# Patient Record
Sex: MALE | Race: WHITE | NOT HISPANIC OR LATINO | URBAN - METROPOLITAN AREA
[De-identification: names, ages, dates, MRNs, and addresses within clinical notes are randomized per-mention and may not be internally consistent; named-entity substitution may affect disease eponyms.]

---

## 2018-02-25 ENCOUNTER — INPATIENT (INPATIENT)
Facility: HOSPITAL | Age: 63
LOS: 3 days | Discharge: HOME | DRG: 194 | End: 2018-03-01
Attending: INTERNAL MEDICINE | Admitting: INTERNAL MEDICINE
Payer: COMMERCIAL

## 2018-02-25 VITALS
DIASTOLIC BLOOD PRESSURE: 75 MMHG | OXYGEN SATURATION: 96 % | SYSTOLIC BLOOD PRESSURE: 120 MMHG | RESPIRATION RATE: 16 BRPM | WEIGHT: 190.04 LBS | HEART RATE: 98 BPM | HEIGHT: 71 IN | TEMPERATURE: 99 F

## 2018-02-25 DIAGNOSIS — I85.00 ESOPHAGEAL VARICES WITHOUT BLEEDING: ICD-10-CM

## 2018-02-25 DIAGNOSIS — R55 SYNCOPE AND COLLAPSE: ICD-10-CM

## 2018-02-25 DIAGNOSIS — R79.89 OTHER SPECIFIED ABNORMAL FINDINGS OF BLOOD CHEMISTRY: ICD-10-CM

## 2018-02-25 DIAGNOSIS — K71.7 TOXIC LIVER DISEASE WITH FIBROSIS AND CIRRHOSIS OF LIVER: ICD-10-CM

## 2018-02-25 DIAGNOSIS — Z29.9 ENCOUNTER FOR PROPHYLACTIC MEASURES, UNSPECIFIED: ICD-10-CM

## 2018-02-25 DIAGNOSIS — D72.829 ELEVATED WHITE BLOOD CELL COUNT, UNSPECIFIED: ICD-10-CM

## 2018-02-25 DIAGNOSIS — J06.9 ACUTE UPPER RESPIRATORY INFECTION, UNSPECIFIED: ICD-10-CM

## 2018-02-25 DIAGNOSIS — K74.60 UNSPECIFIED CIRRHOSIS OF LIVER: ICD-10-CM

## 2018-02-25 DIAGNOSIS — J90 PLEURAL EFFUSION, NOT ELSEWHERE CLASSIFIED: ICD-10-CM

## 2018-02-25 DIAGNOSIS — R50.9 FEVER, UNSPECIFIED: ICD-10-CM

## 2018-02-25 DIAGNOSIS — E80.6 OTHER DISORDERS OF BILIRUBIN METABOLISM: ICD-10-CM

## 2018-02-25 DIAGNOSIS — B18.2 CHRONIC VIRAL HEPATITIS C: ICD-10-CM

## 2018-02-25 DIAGNOSIS — Z98.890 OTHER SPECIFIED POSTPROCEDURAL STATES: Chronic | ICD-10-CM

## 2018-02-25 DIAGNOSIS — J10.1 INFLUENZA DUE TO OTHER IDENTIFIED INFLUENZA VIRUS WITH OTHER RESPIRATORY MANIFESTATIONS: ICD-10-CM

## 2018-02-25 LAB
ALBUMIN SERPL ELPH-MCNC: 2.8 G/DL — LOW (ref 3.3–5)
ALP SERPL-CCNC: 105 U/L — SIGNIFICANT CHANGE UP (ref 40–120)
ALT FLD-CCNC: 39 U/L — SIGNIFICANT CHANGE UP (ref 12–78)
ANION GAP SERPL CALC-SCNC: 7 MMOL/L — SIGNIFICANT CHANGE UP (ref 5–17)
APPEARANCE UR: CLEAR — SIGNIFICANT CHANGE UP
APTT BLD: 38.8 SEC — HIGH (ref 27.5–37.4)
AST SERPL-CCNC: 40 U/L — HIGH (ref 15–37)
BASOPHILS # BLD AUTO: 0.1 K/UL — SIGNIFICANT CHANGE UP (ref 0–0.2)
BASOPHILS NFR BLD AUTO: 0.9 % — SIGNIFICANT CHANGE UP (ref 0–2)
BILIRUB SERPL-MCNC: 5.9 MG/DL — HIGH (ref 0.2–1.2)
BILIRUB UR-MCNC: NEGATIVE — SIGNIFICANT CHANGE UP
BUN SERPL-MCNC: 11 MG/DL — SIGNIFICANT CHANGE UP (ref 7–23)
CALCIUM SERPL-MCNC: 8.5 MG/DL — SIGNIFICANT CHANGE UP (ref 8.5–10.1)
CHLORIDE SERPL-SCNC: 104 MMOL/L — SIGNIFICANT CHANGE UP (ref 96–108)
CK MB BLD-MCNC: <0.8 % — SIGNIFICANT CHANGE UP (ref 0–3.5)
CK MB CFR SERPL CALC: <0.5 NG/ML — SIGNIFICANT CHANGE UP (ref 0–3.6)
CK SERPL-CCNC: 59 U/L — SIGNIFICANT CHANGE UP (ref 26–308)
CO2 SERPL-SCNC: 25 MMOL/L — SIGNIFICANT CHANGE UP (ref 22–31)
COLOR SPEC: YELLOW — SIGNIFICANT CHANGE UP
CREAT SERPL-MCNC: 0.88 MG/DL — SIGNIFICANT CHANGE UP (ref 0.5–1.3)
CRP SERPL-MCNC: 10.8 MG/DL — HIGH (ref 0–0.4)
DIFF PNL FLD: NEGATIVE — SIGNIFICANT CHANGE UP
EOSINOPHIL # BLD AUTO: 0.2 K/UL — SIGNIFICANT CHANGE UP (ref 0–0.5)
EOSINOPHIL NFR BLD AUTO: 1.5 % — SIGNIFICANT CHANGE UP (ref 0–6)
FLUBV RNA SPEC QL NAA+PROBE: DETECTED
GLUCOSE SERPL-MCNC: 134 MG/DL — HIGH (ref 70–99)
GLUCOSE UR QL: NEGATIVE — SIGNIFICANT CHANGE UP
HCT VFR BLD CALC: 38.7 % — LOW (ref 39–50)
HGB BLD-MCNC: 14.2 G/DL — SIGNIFICANT CHANGE UP (ref 13–17)
INR BLD: 1.63 RATIO — HIGH (ref 0.88–1.16)
KETONES UR-MCNC: NEGATIVE — SIGNIFICANT CHANGE UP
LACTATE SERPL-SCNC: 1.6 MMOL/L — SIGNIFICANT CHANGE UP (ref 0.7–2)
LACTATE SERPL-SCNC: 2.8 MMOL/L — HIGH (ref 0.7–2)
LEUKOCYTE ESTERASE UR-ACNC: ABNORMAL
LIDOCAIN IGE QN: 82 U/L — SIGNIFICANT CHANGE UP (ref 73–393)
LYMPHOCYTES # BLD AUTO: 0.8 K/UL — LOW (ref 1–3.3)
LYMPHOCYTES # BLD AUTO: 6.9 % — LOW (ref 13–44)
MCHC RBC-ENTMCNC: 35.5 PG — HIGH (ref 27–34)
MCHC RBC-ENTMCNC: 36.8 GM/DL — HIGH (ref 32–36)
MCV RBC AUTO: 96.4 FL — SIGNIFICANT CHANGE UP (ref 80–100)
MONOCYTES # BLD AUTO: 1.1 K/UL — HIGH (ref 0–0.9)
MONOCYTES NFR BLD AUTO: 9 % — SIGNIFICANT CHANGE UP (ref 1–9)
NEUTROPHILS # BLD AUTO: 9.9 K/UL — HIGH (ref 1.8–7.4)
NEUTROPHILS NFR BLD AUTO: 81.8 % — HIGH (ref 43–77)
NITRITE UR-MCNC: NEGATIVE — SIGNIFICANT CHANGE UP
PH UR: 7 — SIGNIFICANT CHANGE UP (ref 5–8)
PLATELET # BLD AUTO: 114 K/UL — LOW (ref 150–400)
POTASSIUM SERPL-MCNC: 4.2 MMOL/L — SIGNIFICANT CHANGE UP (ref 3.5–5.3)
POTASSIUM SERPL-SCNC: 4.2 MMOL/L — SIGNIFICANT CHANGE UP (ref 3.5–5.3)
PROCALCITONIN SERPL-MCNC: 0.55 NG/ML — HIGH (ref 0–0.04)
PROT SERPL-MCNC: 7.2 G/DL — SIGNIFICANT CHANGE UP (ref 6–8.3)
PROT UR-MCNC: 25 MG/DL
PROTHROM AB SERPL-ACNC: 17.9 SEC — HIGH (ref 9.8–12.7)
RAPID RVP RESULT: DETECTED
RBC # BLD: 4.01 M/UL — LOW (ref 4.2–5.8)
RBC # FLD: 12.8 % — SIGNIFICANT CHANGE UP (ref 10.3–14.5)
SODIUM SERPL-SCNC: 136 MMOL/L — SIGNIFICANT CHANGE UP (ref 135–145)
SP GR SPEC: 1 — LOW (ref 1.01–1.02)
T3 SERPL-MCNC: 56 NG/DL — LOW (ref 80–200)
T4 AB SER-ACNC: 4.9 UG/DL — SIGNIFICANT CHANGE UP (ref 4.6–12)
TROPONIN I SERPL-MCNC: <.015 NG/ML — SIGNIFICANT CHANGE UP (ref 0.01–0.04)
TSH SERPL-MCNC: 1.25 UIU/ML — SIGNIFICANT CHANGE UP (ref 0.36–3.74)
UROBILINOGEN FLD QL: 1
WBC # BLD: 12.1 K/UL — HIGH (ref 3.8–10.5)
WBC # FLD AUTO: 12.1 K/UL — HIGH (ref 3.8–10.5)
WBC UR QL: SIGNIFICANT CHANGE UP /HPF (ref 0–5)

## 2018-02-25 PROCEDURE — 72125 CT NECK SPINE W/O DYE: CPT | Mod: 26

## 2018-02-25 PROCEDURE — 71260 CT THORAX DX C+: CPT | Mod: 26

## 2018-02-25 PROCEDURE — 71045 X-RAY EXAM CHEST 1 VIEW: CPT | Mod: 26

## 2018-02-25 PROCEDURE — 93010 ELECTROCARDIOGRAM REPORT: CPT

## 2018-02-25 PROCEDURE — 70450 CT HEAD/BRAIN W/O DYE: CPT | Mod: 26

## 2018-02-25 PROCEDURE — 99285 EMERGENCY DEPT VISIT HI MDM: CPT

## 2018-02-25 PROCEDURE — 74177 CT ABD & PELVIS W/CONTRAST: CPT | Mod: 26

## 2018-02-25 PROCEDURE — 99223 1ST HOSP IP/OBS HIGH 75: CPT | Mod: AI,GC

## 2018-02-25 PROCEDURE — 99255 IP/OBS CONSLTJ NEW/EST HI 80: CPT

## 2018-02-25 RX ORDER — URSODIOL 250 MG/1
300 TABLET, FILM COATED ORAL
Qty: 0 | Refills: 0 | Status: DISCONTINUED | OUTPATIENT
Start: 2018-02-25 | End: 2018-03-01

## 2018-02-25 RX ORDER — CEFTRIAXONE 500 MG/1
1 INJECTION, POWDER, FOR SOLUTION INTRAMUSCULAR; INTRAVENOUS ONCE
Qty: 0 | Refills: 0 | Status: COMPLETED | OUTPATIENT
Start: 2018-02-25 | End: 2018-02-25

## 2018-02-25 RX ORDER — ALBUTEROL 90 UG/1
2.5 AEROSOL, METERED ORAL EVERY 12 HOURS
Qty: 0 | Refills: 0 | Status: DISCONTINUED | OUTPATIENT
Start: 2018-02-25 | End: 2018-03-01

## 2018-02-25 RX ORDER — MORPHINE SULFATE 50 MG/1
2 CAPSULE, EXTENDED RELEASE ORAL EVERY 6 HOURS
Qty: 0 | Refills: 0 | Status: DISCONTINUED | OUTPATIENT
Start: 2018-02-25 | End: 2018-03-01

## 2018-02-25 RX ORDER — IOHEXOL 300 MG/ML
15 INJECTION, SOLUTION INTRAVENOUS ONCE
Qty: 0 | Refills: 0 | Status: COMPLETED | OUTPATIENT
Start: 2018-02-25 | End: 2018-02-25

## 2018-02-25 RX ORDER — AZITHROMYCIN 500 MG/1
500 TABLET, FILM COATED ORAL EVERY 24 HOURS
Qty: 0 | Refills: 0 | Status: DISCONTINUED | OUTPATIENT
Start: 2018-02-26 | End: 2018-03-01

## 2018-02-25 RX ORDER — AZITHROMYCIN 500 MG/1
500 TABLET, FILM COATED ORAL ONCE
Qty: 0 | Refills: 0 | Status: COMPLETED | OUTPATIENT
Start: 2018-02-25 | End: 2018-02-25

## 2018-02-25 RX ORDER — PHYTONADIONE (VIT K1) 5 MG
2.5 TABLET ORAL DAILY
Qty: 0 | Refills: 0 | Status: DISCONTINUED | OUTPATIENT
Start: 2018-02-25 | End: 2018-03-01

## 2018-02-25 RX ORDER — ENOXAPARIN SODIUM 100 MG/ML
40 INJECTION SUBCUTANEOUS EVERY 24 HOURS
Qty: 0 | Refills: 0 | Status: DISCONTINUED | OUTPATIENT
Start: 2018-02-25 | End: 2018-02-27

## 2018-02-25 RX ORDER — SODIUM CHLORIDE 9 MG/ML
1000 INJECTION INTRAMUSCULAR; INTRAVENOUS; SUBCUTANEOUS
Qty: 0 | Refills: 0 | Status: COMPLETED | OUTPATIENT
Start: 2018-02-25 | End: 2018-02-25

## 2018-02-25 RX ORDER — IBUPROFEN 200 MG
400 TABLET ORAL DAILY
Qty: 0 | Refills: 0 | Status: DISCONTINUED | OUTPATIENT
Start: 2018-02-25 | End: 2018-03-01

## 2018-02-25 RX ORDER — CEFTRIAXONE 500 MG/1
1 INJECTION, POWDER, FOR SOLUTION INTRAMUSCULAR; INTRAVENOUS EVERY 24 HOURS
Qty: 0 | Refills: 0 | Status: DISCONTINUED | OUTPATIENT
Start: 2018-02-26 | End: 2018-03-01

## 2018-02-25 RX ADMIN — MORPHINE SULFATE 2 MILLIGRAM(S): 50 CAPSULE, EXTENDED RELEASE ORAL at 16:20

## 2018-02-25 RX ADMIN — Medication 75 MILLIGRAM(S): at 11:36

## 2018-02-25 RX ADMIN — ENOXAPARIN SODIUM 40 MILLIGRAM(S): 100 INJECTION SUBCUTANEOUS at 18:44

## 2018-02-25 RX ADMIN — Medication 75 MILLIGRAM(S): at 21:26

## 2018-02-25 RX ADMIN — MORPHINE SULFATE 2 MILLIGRAM(S): 50 CAPSULE, EXTENDED RELEASE ORAL at 22:46

## 2018-02-25 RX ADMIN — AZITHROMYCIN 255 MILLIGRAM(S): 500 TABLET, FILM COATED ORAL at 11:36

## 2018-02-25 RX ADMIN — SODIUM CHLORIDE 1000 MILLILITER(S): 9 INJECTION INTRAMUSCULAR; INTRAVENOUS; SUBCUTANEOUS at 08:50

## 2018-02-25 RX ADMIN — MORPHINE SULFATE 2 MILLIGRAM(S): 50 CAPSULE, EXTENDED RELEASE ORAL at 23:05

## 2018-02-25 RX ADMIN — SODIUM CHLORIDE 1000 MILLILITER(S): 9 INJECTION INTRAMUSCULAR; INTRAVENOUS; SUBCUTANEOUS at 09:50

## 2018-02-25 RX ADMIN — IOHEXOL 15 MILLILITER(S): 300 INJECTION, SOLUTION INTRAVENOUS at 08:50

## 2018-02-25 RX ADMIN — URSODIOL 300 MILLIGRAM(S): 250 TABLET, FILM COATED ORAL at 18:44

## 2018-02-25 RX ADMIN — Medication 600 MILLIGRAM(S): at 18:44

## 2018-02-25 RX ADMIN — CEFTRIAXONE 100 GRAM(S): 500 INJECTION, POWDER, FOR SOLUTION INTRAMUSCULAR; INTRAVENOUS at 09:50

## 2018-02-25 RX ADMIN — Medication 2.5 MILLIGRAM(S): at 18:45

## 2018-02-25 RX ADMIN — Medication 400 MILLIGRAM(S): at 19:41

## 2018-02-25 RX ADMIN — MORPHINE SULFATE 2 MILLIGRAM(S): 50 CAPSULE, EXTENDED RELEASE ORAL at 15:27

## 2018-02-25 NOTE — H&P ADULT - NSHPOUTPATIENTPROVIDERS_GEN_ALL_CORE
PCP: PCP: Dr. Currie  GI: Dr. Jose E Rajput PCP: Dr. Mustapha Currie  GI: Dr. Jose E Rajput PCP: Dr. Mustapha Currie informed  GI: Dr. Jose E Rajput

## 2018-02-25 NOTE — ED PROVIDER NOTE - OBJECTIVE STATEMENT
63 yo M P/w cough, congestion, URI, malaise x past 4 days. Yesterday, pt was in shower and suddenly passed out. Pt hit back on tub. No known head trauma. NO neck  pain. NO numb/ting/focal weak. no abd pain. NO vomiting / diarrhea. No cp/sob/palp. No recent trauma otherwise. No other inj or co.

## 2018-02-25 NOTE — H&P ADULT - NSHPREVIEWOFSYSTEMS_GEN_ALL_CORE
CONSTITUTIONAL: denies fever, chills, fatigue, weakness  HEENT: denies blurred visions, sore throat  SKIN: denies new lesions, rash  CARDIOVASCULAR: denies chest pain, chest pressure, palpitations  RESPIRATORY: denies shortness of breath, sputum production  GASTROINTESTINAL: denies nausea, vomiting, diarrhea, abdominal pain  GENITOURINARY: denies dysuria, discharge  NEUROLOGICAL: denies numbness, headache, focal weakness  MUSCULOSKELETAL: denies new joint pain, muscle aches  HEMATOLOGIC: denies gross bleeding, bruising  LYMPHATICS: denies enlarged lymph nodes, extremity swelling  PSYCHIATRIC: denies recent changes in anxiety, depression  ENDOCRINOLOGIC: denies sweating, cold or heat intolerance CONSTITUTIONAL: admits to fever, chills, fatigue, weakness  HEENT: denies blurred visions, sore throat  SKIN: denies new lesions, rash  CARDIOVASCULAR: admits to chest pain but says it must be from his lung because of the "fluid", denies chest pressure, palpitations  RESPIRATORY: admits to shortness of breath, denies sputum production  GASTROINTESTINAL: denies nausea, vomiting, diarrhea, abdominal pain  GENITOURINARY: admits to urine being darker because he hasn't been drinking much, denies dysuria, discharge  NEUROLOGICAL: admits to headache, denies numbness, focal weakness  MUSCULOSKELETAL: admits to generalized body aches, denies new joint pain  HEMATOLOGIC: denies gross bleeding, bruising  LYMPHATICS: denies enlarged lymph nodes, extremity swelling  ENDOCRINOLOGIC: denies sweating, cold or heat intolerance

## 2018-02-25 NOTE — H&P ADULT - ATTENDING COMMENTS
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 years ago with hep c  cirrhosis, He (treated in the past with Interferon, Ribavirin, neupogen, victrellis), hx of pleural effusions (Last 2012) s/p thoracentesies, was transudative at that time, pt states for past few days was not feeling well, cough, mylagia,   pt had a syncopal episode in bathtub  assesment/plan  syncopal episode suspect it from dehydration from viral syndrome , and orthostatic as pt on nadolol  check orthostasis  ct head reviewed  tele mointoring for 24 hours  caution iv hydration   FLU + Influ B  isolation  tamiflu 75 mg bid   lactate mildly elevated likely from liver pathology  for pyretic give motrin       2. liver cirrhosis with elevated bili and couglopathic INR 1.6  baseline INR 1.3-1.4  no abvious source of bleeding  pt has loculated pleural effusion  ascites?  doubt infectious process, but until thoracentsies concluded will start emprically on rocephin/ztihroamx   will need thoracentsies diagnsotic    3. coagulaopthic INR 1.6  likely multi factorial , liver cirrhosis, with viral syndrome  repaet INR in am   avoid liver toxic meds    4. esophageal varices, gastric varices   no prior history of bleeds, no prior history of banding,   had EGD in november 2017, was stable In size  continue on nadolol     5. dvt prophalxis lovenox   hold tonight as plan for thoracenteises 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 years ago with hep c  cirrhosis, He (treated in the past with Interferon, Ribavirin, neupogen, victrellis), hx of pleural effusions (Last 2012) s/p thoracentesies, was transudative at that time, pt states for past few days was not feeling well, cough, mylagia,   pt had a syncopal episode in bathtub  assessment/Plan  syncopal episode suspect it from dehydration from viral syndrome , and orthostatic as pt on nadolol  check orthostasis  ct head reviewed  tele mointoring for 24 hours  caution iv hydration   FLU + Influ B  isolation  tamiflu 75 mg bid   lactate mildly elevated likely from liver pathology  for pyretic give motrin       2. liver cirrhosis with elevated bili and couglopathic INR 1.6, and bilirubin  baseline INR 1.3-1.4  no abvious source of bleeding  pt has loculated pleural effusion  ascites?  doubt infectious process, but until thoracentsies concluded will start empirically on rocephin/ztihroamx   will need thoracentesis diagnostic  pt bilirubin 5.4, no jaundice, scletric icterus,   ct reviewed, cholethiasis, will consult with GI      3. coagulaopthic INR 1.6  likely multi factorial , liver cirrhosis, with viral syndrome  repaet INR in am   avoid liver toxic meds    4. esophageal varices, gastric varices   no prior history of bleeds, no prior history of banding,   had EGD in november 2017, was stable In size  continue on nadolol     5. dvt prophalxis lovenox   hold tonight as plan for thoracenteises

## 2018-02-25 NOTE — H&P ADULT - HISTORY OF PRESENT ILLNESS
62 year old M with PMHx of esophageal varices complaining of syncope. He is complaining of cough, congestion, URI, malaise x4 days. He states he was in shower yesterday and he suddenly passed out. He denies head trauma, neck pain, numbness/tingling but admits to hitting his back on the tub.     In the ED, the patient's vital signs were T: 99, HR 98, /75, R 16, SpO2 96% RA. WBC 12.1. AST slightly elevated to 40. Lactate 2.8 Cardiac enzymes negative x1. Patient is positive for Influenza B. CT cervical spine: No acute fracture or traumatic subluxation. Air-fluid levels within the bilateral maxillary sinuses. Correlate clinically for acute sinusitis.Right pleural thickening, partially imaged. CT chest: Loculated medium sized right pleural effusion with pleural thickening. There is mild diffuse pleural enhancement. An empyema or neoplastic process is not excluded. Nonspecific 2.3 x 2.0 cm left hilar adenopathy. Cirrhosis with associated manifestations as detailed above. CXR: Moderate right pleural effusion. Underlying right sided pneumonia cannot be excluded. Patient given 1L NS bolus x2. Patient started on IV Zithromax, IV Ceftriaxone, Tamiflu. Blood and urine cultures pending. Repeat lactate pending. 62 year old M with PMHx of esophageal varices, cirrhosis, Hepatitis C (treated in the past with Interferon), hx of pleural effusions (Last 2012) complaining of syncope. He is complaining of fever at home ( Tmax 101.5), cough, congestion, URI, right sided chest pain, myalgias and malaise that has been worsening over the last 4 days. He denies sick contacts at home. He has been taking tylenol for the fever. He states he was in shower yesterday and he suddenly passed out. He denies head trauma, neck pain, numbness/tingling but admits to hitting his back on the tub. He doesn't remember the incident but his wife was able to help him back up. He admits to "passing out" in the past, about 6 times when he used to take Interferon for Hepatitis C. He believes that this time he must have passed out because his Blood pressure was too low while taking his Nadolol. He states he has not been eating or drinking much.     In the ED, the patient's vital signs were T: 99, HR 98, /75, R 16, SpO2 96% RA. WBC 12.1. AST slightly elevated to 40. Bilirubin 5.9. Lactate 2.8 Cardiac enzymes negative x1. Patient is positive for Influenza B. CT cervical spine: No acute fracture or traumatic subluxation. Air-fluid levels within the bilateral maxillary sinuses. Correlate clinically for acute sinusitis. Right pleural thickening, partially imaged. CT chest: Loculated medium sized right pleural effusion with pleural thickening. There is mild diffuse pleural enhancement. An empyema or neoplastic process is not excluded. Nonspecific 2.3 x 2.0 cm left hilar adenopathy. Cirrhosis with associated manifestations as detailed above. CXR: Moderate right pleural effusion. Underlying right sided pneumonia cannot be excluded. EKG shows NSR 94, left atrial enlargement. Patient given 1L NS bolus x2. Patient started on IV Zithromax, IV Ceftriaxone, Tamiflu. Blood and urine cultures pending. Repeat lactate pending. 62 year old M with PMHx of esophageal varices, cirrhosis, Hepatitis C (treated in the past with Interferon, Ribavirin, neupogen, victrellis), hx of pleural effusions (Last 2012) complaining of syncope. He is complaining of fever at home ( Tmax 101.5), cough, congestion, URI, right sided chest pain, myalgias and malaise that has been worsening over the last 4 days. He denies sick contacts at home. He has been taking tylenol for the fever. He states he was in shower yesterday and he suddenly passed out. He denies head trauma, neck pain, numbness/tingling but admits to hitting his back on the tub. He doesn't remember the incident but his wife was able to help him back up. He admits to "passing out" in the past, about 6 times when he used to take Interferon for Hepatitis C. He believes that this time he must have passed out because his Blood pressure was too low while taking his Nadolol. He states he has not been eating or drinking much.     In the ED, the patient's vital signs were T: 99, HR 98, /75, R 16, SpO2 96% RA. WBC 12.1. AST slightly elevated to 40. Bilirubin 5.9. Lactate 2.8 Cardiac enzymes negative x1. Patient is positive for Influenza B. CT cervical spine: No acute fracture or traumatic subluxation. Air-fluid levels within the bilateral maxillary sinuses. Correlate clinically for acute sinusitis. Right pleural thickening, partially imaged. CT chest: Loculated medium sized right pleural effusion with pleural thickening. There is mild diffuse pleural enhancement. An empyema or neoplastic process is not excluded. Nonspecific 2.3 x 2.0 cm left hilar adenopathy. Cirrhosis with associated manifestations as detailed above. CXR: Moderate right pleural effusion. Underlying right sided pneumonia cannot be excluded. EKG shows NSR 94, left atrial enlargement. Patient given 1L NS bolus x2. Patient started on IV Zithromax, IV Ceftriaxone, Tamiflu. Blood and urine cultures pending. Repeat lactate pending.

## 2018-02-25 NOTE — H&P ADULT - PROBLEM SELECTOR PLAN 6
IMPROVE VTE Individual Risk Assessment          RISK                                                          Points  [  ] Previous VTE                                                3  [  ] Thrombophilia                                             2  [  ] Lower limb paralysis                                   2        (unable to hold up >15 seconds)    [  ] Current Cancer                                             2         (within 6 months)  [  ] Immobilization > 24 hrs                              1  [  ] ICU/CCU stay > 24 hours                             1  [ x ] Age > 60                                                         1    IMPROVE VTE Score: 1    Lovenox 40 SQ daily for dvt ppx Esophageal varices, chronic.   - Continue Nadolol with hold parameters  - Monitor vital signs Esophageal varices, chronic.   - Continue home Nadolol. Patient to take home medicine as we do not carry it at [facility].   - Monitor vital signs Esophageal varices, chronic.   - Continue home Nadolol. Patient to take home medicine as we do not carry it at [facility]. Patient's wife refusing to give Nadolol and giving medicine staff a difficult time. Patient stated he will just not take his Nadolol while in the hospital.   - Monitor vital signs

## 2018-02-25 NOTE — H&P ADULT - PROBLEM SELECTOR PLAN 5
Esophageal varices, chronic.   - Continue Nadolol Esophageal varices, chronic.   - Continue Nadolol   - Monitor vital signs Bili elevated to 5.9, likely 2/2 cirrhosis.   -f/u RUQ US   - Dr. Cornelius, GI, consulted

## 2018-02-25 NOTE — H&P ADULT - ASSESSMENT
62 year old M with PMHx of esophageal varices complaining of syncope, admitted for viral URI (positive influenza B), pleural effusion, syncope workup.

## 2018-02-25 NOTE — H&P ADULT - PROBLEM SELECTOR PLAN 2
pleural effusion as seen on CT chest: Loculated medium sized right pleural effusion with pleural thickening. There is mild diffuse pleural enhancement. An empyema or neoplastic process is not excluded. Nonspecific 2.3 x 2.0 cm left hilar adenopathy.  - continue supplemental O2 as needed Hx of pleural effusions with new pleural effusion as seen on CT chest: Loculated medium sized right pleural effusion with pleural thickening. There is mild diffuse pleural enhancement.   - Albuterol nebs q12 hours  - Patient had pleural fluid drained last in 2012.   - continue supplemental O2 as needed  -Pulm following, appreciate recs  -continue Incentive spirometry   -Cardiothoracic surgery consulted as per Pulmonologist

## 2018-02-25 NOTE — H&P ADULT - NSHPPHYSICALEXAM_GEN_ALL_CORE
T(C): 37.2 (02-25-18 @ 08:07), Max: 37.2 (02-25-18 @ 08:07)  HR: 98 (02-25-18 @ 08:07) (98 - 98)  BP: 120/75 (02-25-18 @ 08:07) (120/75 - 120/75)  RR: 16 (02-25-18 @ 08:07) (16 - 16)  SpO2: 96% (02-25-18 @ 08:07) (96% - 96%)    GENERAL: patient appears well, no acute distress, appropriate, pleasant  EYES: sclera clear, no exudates  ENMT: oropharynx clear without erythema, no exudates, moist mucous membranes  NECK: supple, soft, no thyromegaly noted  LUNGS: good air entry bilaterally, clear to auscultation, symmetric breath sounds, no wheezing or rhonchi appreciated  HEART: soft S1/S2, regular rate and rhythm, no murmurs noted, no lower extremity edema  GASTROINTESTINAL: abdomen is soft, nontender, nondistended, normoactive bowel sounds, no palpable masses  INTEGUMENT: good skin turgor, no lesions noted  MUSCULOSKELETAL: no clubbing or cyanosis, no obvious deformity  NEUROLOGIC: awake, alert, oriented x3, good muscle tone in 4 extremities, no obvious sensory deficits  PSYCHIATRIC: mood is good, affect is congruent, linear and logical thought process  HEME/LYMPH: no palpable supraclavicular nodules, no obvious ecchymosis or petechiae T(C): 37.2 (02-25-18 @ 08:07), Max: 37.2 (02-25-18 @ 08:07)  HR: 98 (02-25-18 @ 08:07) (98 - 98)  BP: 120/75 (02-25-18 @ 08:07) (120/75 - 120/75)  RR: 16 (02-25-18 @ 08:07) (16 - 16)  SpO2: 96% (02-25-18 @ 08:07) (96% - 96%)    GENERAL: MIddle Aged  male, appears mildly ill, no acute distress, appropriate, pleasant  EYES: sclera clear, no exudates  ENMT: oropharynx clear without erythema, no exudates, mucous membranes appear dry   NECK: supple, soft, no thyromegaly noted  LUNGS: Limited as patient does not take deep breaths, +crackles on the right lower lobe, no wheezing or rhonchi appreciated  HEART: soft S1/S2, regular rate and rhythm, no murmurs noted, no lower extremity edema  GASTROINTESTINAL: abdomen is soft, nontender, nondistended, normoactive bowel sounds, no palpable masses  : Urinal noted at bedside to have dark colored urine (rust colored), which patient states is because of dehydration.   INTEGUMENT: good skin turgor, no lesions noted  MUSCULOSKELETAL: no clubbing or cyanosis, no obvious deformity  NEUROLOGIC: awake, alert, oriented x3, good muscle tone in 4 extremities, no obvious sensory deficits  PSYCHIATRIC: mood is good, affect is congruent, linear and logical thought process  HEME/LYMPH: no palpable supraclavicular nodules, no obvious ecchymosis or petechiae T(C): 37.2 (02-25-18 @ 08:07), Max: 37.2 (02-25-18 @ 08:07)  HR: 98 (02-25-18 @ 08:07) (98 - 98)  BP: 120/75 (02-25-18 @ 08:07) (120/75 - 120/75)  RR: 16 (02-25-18 @ 08:07) (16 - 16)  SpO2: 96% (02-25-18 @ 08:07) (96% - 96%)    GENERAL: MIddle Aged  male, appears mildly ill, no acute distress, appropriate, pleasant  EYES: sclera clear, no exudates  ENMT: oropharynx clear without erythema, no exudates, mucous membranes appear dry   NECK: supple, soft, no thyromegaly noted  LUNGS: Limited as patient does not take deep breaths, +crackles on the right lower lobe, decreased air entry on the right lower lobe, no wheezing or rhonchi appreciated  HEART: soft S1/S2, regular rate and rhythm, no murmurs noted, no lower extremity edema  GASTROINTESTINAL: abdomen is soft, nontender, nondistended, normoactive bowel sounds, no palpable masses  : Urinal noted at bedside to have dark colored urine (rust colored), which patient states is because of dehydration.   INTEGUMENT: good skin turgor, no lesions noted  MUSCULOSKELETAL: no clubbing or cyanosis, no obvious deformity  NEUROLOGIC: awake, alert, oriented x3, good muscle tone in 4 extremities, no obvious sensory deficits  PSYCHIATRIC: mood is good, affect is congruent, linear and logical thought process  HEME/LYMPH: no palpable supraclavicular nodules, no obvious ecchymosis or petechiae

## 2018-02-25 NOTE — ED ADULT NURSE NOTE - OBJECTIVE STATEMENT
pt reports dizzy, fever, chills onset 4 days ago. highest temp 101F, denies CP/SHOB. +syncopal episode last night following nausea + fever. A+Ox4

## 2018-02-25 NOTE — H&P ADULT - PROBLEM SELECTOR PLAN 1
Leukocytosis 2/2 viral URI. RVP positive for Influenza B. WBC 12.1   - admit to [facility]  - Patient started on IV Azithro and IV Ceftriaxone. Will continue antibiotics   - fall precautions  -safety precautions   -monitor AM labs Leukocytosis 2/2 viral URI. RVP positive for Influenza B. WBC 12.1   - admit to tele  - Patient started on IV Azithro and IV Ceftriaxone and received Tamiflu 75mg x1 in the ED. Will continue azithro IV 500mg q24 hours, Rocephin IV 1g [id number] \  -Continue Mucinex 600mg q12 hours   -f/u blood and urine culture   - fall precautions  -safety precautions   -monitor AM labs Leukocytosis 2/2 viral URI. RVP positive for Influenza B. WBC 12.1   - admit to tele  - Patient started on IV Azithro and IV Ceftriaxone and received Tamiflu 75mg x1 in the ED. Will continue azithro IV 500mg q24 hours, Rocephin IV 1g [id number], Tamiflux 75mg BID x5 days  -Continue Mucinex 600mg q12 hours   - Motrin prn for fever in setting of hx of cirrhosis   -f/u blood and urine culture   - fall precautions  -safety precautions   -monitor AM labs Leukocytosis 2/2 viral URI. RVP positive for Influenza B. WBC 12.1   - admit to tele  - Patient started on IV Azithro and IV Ceftriaxone and received Tamiflu 75mg x1 in the ED. Will continue azithro IV 500mg q24 hours, Rocephin IV 1g [id number], Tamiflux 75mg BID x5 days  -Continue Mucinex 600mg q12 hours   - Motrin prn for fever in setting of hx of cirrhosis   -f/u blood and urine culture   -f/u legionella pnuemophila antigen, streptococcus Pneumoniae antigen urine, naropharyngeal culture   - fall precautions  -safety precautions   -monitor AM labs

## 2018-02-25 NOTE — H&P ADULT - NSHPSOCIALHISTORY_GEN_ALL_CORE
Social History:    Marital Status:  (   )    (   ) Single    (   )    (  )   Occupation:   Lives with: (  ) alone  (  ) children   (  ) spouse   (  ) parents  (  ) other    Substance Use (street drugs): (  ) never used  (  ) other:  Tobacco Usage:  (   ) never smoked   (   ) former smoker   (   ) current smoker  (     ) pack year  (        ) last cigarette date  Alcohol Usage:  Sexual History:     Health Management     For female:   Last Mammo: (     ) No  (    ) Yes  (    ) Normal  (    ) Date   Last Pap: (     ) No  (    ) Yes  (    ) Normal   (    ) Date     For male:  Last prostate exam: (     ) No  (    ) Yes  (    ) Date  (    ) Normal    Immunization Hx:   (  ) flu shot                               (     ) date   (  ) pneumonia shot               (     ) date  (  ) tetanus                               (     ) date     (     ) Advanced Directives: (     ) None    (      ) DNR    (     ) DNI    (     ) Health Care Proxy: Social History: Retired. Ambulates independently without assistive devices.     Marital Status:  (  x)    (   ) Single    (   )    (  )   Occupation:   Lives with: (  ) alone  (  ) children   (x  ) spouse   (  ) parents  (  ) other    Substance Use (street drugs): (  ) never used  ( x ) other: He says he is recovered recreational drug user.   Tobacco Usage:  (   ) never smoked   (x- quit 30 years ago  ) former smoker   (   ) current smoker  (     ) pack year  (        ) last cigarette date  Alcohol Usage: Denies current EtOH use. Quit 30 years ago.     (     ) Advanced Directives: (   x  ) None    (      ) DNR    (     ) DNI    (     ) Health Care Proxy: Social History: Retired. Ambulates independently without assistive devices.     Marital Status:  (  x)    (   ) Single    (   )    (  )   Occupation:   Lives with: (  ) alone  (  ) children   (x  ) spouse   (  ) parents  (  ) other    Substance Use (street drugs): (  ) never used  ( x ) other: He says he is recovered IV drug user.   Tobacco Usage:  (   ) never smoked   (x- quit 30 years ago  ) former smoker   (   ) current smoker  (     ) pack year  (        ) last cigarette date  Alcohol Usage: Denies current EtOH use. Quit 30 years ago.     (     ) Advanced Directives: (   x  ) None    (      ) DNR    (     ) DNI    (     ) Health Care Proxy:

## 2018-02-25 NOTE — ED PROVIDER NOTE - CARE PLAN
Principal Discharge DX:	Febrile illness, acute  Goal:	ro empyema  Secondary Diagnosis:	Pleural effusion  Secondary Diagnosis:	Influenza B

## 2018-02-25 NOTE — CONSULT NOTE ADULT - ASSESSMENT
62 year old M with PMHx of esophageal varices from Hep C cirrhosis presents with  syncope.   9 His syncope is likely from dehydration and increased vagal tone. 62 year old M with PMHx of esophageal varices from Hep C cirrhosis presents with  syncope.   - His syncope is likely from dehydration and increased vagal tone.  Can monitor on remote tele for 24 hours.   - Pleural effusion may be from underlying infection.   - F/U pulm.   - May need thoracentesis  - Doubt HF  - No signs of ischemia. Check 12 lead EKG  - Check echo  - Monitor for bleeding given varices  - Monitor and replete electrolytes. Keep K>4.0 and Mg>2.0.   - Further cardiac workup will depend on clinical course.   - All other workup per primary team. Will followup.

## 2018-02-25 NOTE — CONSULT NOTE ADULT - PROBLEM SELECTOR RECOMMENDATION 9
advanced liver disease  follows at [facility]  not on transplant list at present as per pt  labs reviewed, synthetic function of the liver is compromised  supportive care and regimen  GI eval
check hep c pcr he was shruti

## 2018-02-25 NOTE — H&P ADULT - PROBLEM SELECTOR PLAN 4
Elevate lactate 2/2 Positive RVP. Initial lactate 2.8.   - Patient received 1 L NS bolus x2.   - Repeat lactate pending  -f/u AM labs

## 2018-02-25 NOTE — CONSULT NOTE ADULT - PROBLEM SELECTOR RECOMMENDATION 2
complex pleural eff  likely related to cirrhosis  prev thoracentesis x 2  poss PNA  will treat with CAP emp regimen ABX  will ask for thoracic surgery consultation  hold off on thoracentesis as this eff may need a more advanced surgical or interventional procedure  I heber  ambulate  sat on room air is 97 pct  discussed with pt and wife  will follow up
check lfts  vitamin k  ursodiol 300 bid to be started

## 2018-02-25 NOTE — PATIENT PROFILE ADULT. - SOURCE OF INFORMATION, PROFILE
patient/Copy forward H&P from 2/25/2018,information verified by patient family/chart(s)/Copy forward H&P from 2/25/2018,information verified by patient/patient

## 2018-02-25 NOTE — ED PROVIDER NOTE - ENMT, MLM
Airway patent, Nasal mucosa clear. Mouth with normal mucosa. Throat has no vesicles, no oropharyngeal exudates and uvula is midline. MM Moist. Neck supple. no meningeal signs. no ext signs of head trauma.

## 2018-02-25 NOTE — PATIENT PROFILE ADULT. - VISION (WITH CORRECTIVE LENSES IF THE PATIENT USUALLY WEARS THEM):
Normal vision: sees adequately in most situations; can see medication labels, newsprint Normal vision: sees adequately in most situations; can see medication labels, newsprint/reading and distance glasses not with pt

## 2018-02-25 NOTE — ED ADULT TRIAGE NOTE - CHIEF COMPLAINT QUOTE
pt states fever, chills and headache Thursday, syncopal episode in the shower last night and pain with deep inspiration today.

## 2018-02-25 NOTE — H&P ADULT - PROBLEM SELECTOR PLAN 3
syncope 2/2 acute illness, unclear etiology? syncope 2/2 orthostatic hypotension vs. dehydration.   - f/u TTE  -Cardio, Dr. El, consulted syncope 2/2 orthostatic hypotension vs. dehydration.   - f/u TTE   -Cardio, Dr. El, consulted

## 2018-02-25 NOTE — H&P ADULT - PROBLEM SELECTOR PLAN 7
IMPROVE VTE Individual Risk Assessment          RISK                                                          Points  [  ] Previous VTE                                                3  [  ] Thrombophilia                                             2  [  ] Lower limb paralysis                                   2        (unable to hold up >15 seconds)    [  ] Current Cancer                                             2         (within 6 months)  [  ] Immobilization > 24 hrs                              1  [  ] ICU/CCU stay > 24 hours                             1  [ x ] Age > 60                                                         1    IMPROVE VTE Score: 1    Lovenox 40 SQ daily for dvt ppx

## 2018-02-25 NOTE — CONSULT NOTE ADULT - PROBLEM SELECTOR RECOMMENDATION 3
Flu and poss PNA  emp ABX  check legionella ag and strep ag and mycoplasma  pt is immunocompromised - liver cirrhosis and hep c  will monitor vs and HD and Sat  nebs BID  mucinex BID  supportive care and regimen  will follow  isolation precs. Statement Selected

## 2018-02-26 DIAGNOSIS — Z71.89 OTHER SPECIFIED COUNSELING: ICD-10-CM

## 2018-02-26 DIAGNOSIS — J18.9 PNEUMONIA, UNSPECIFIED ORGANISM: ICD-10-CM

## 2018-02-26 LAB
ALBUMIN SERPL ELPH-MCNC: 2.2 G/DL — LOW (ref 3.3–5)
ALP SERPL-CCNC: 72 U/L — SIGNIFICANT CHANGE UP (ref 40–120)
ALT FLD-CCNC: 23 U/L — SIGNIFICANT CHANGE UP (ref 12–78)
ANION GAP SERPL CALC-SCNC: 7 MMOL/L — SIGNIFICANT CHANGE UP (ref 5–17)
AST SERPL-CCNC: 25 U/L — SIGNIFICANT CHANGE UP (ref 15–37)
BILIRUB SERPL-MCNC: 4.2 MG/DL — HIGH (ref 0.2–1.2)
BUN SERPL-MCNC: 15 MG/DL — SIGNIFICANT CHANGE UP (ref 7–23)
CALCIUM SERPL-MCNC: 7.7 MG/DL — LOW (ref 8.5–10.1)
CHLORIDE SERPL-SCNC: 107 MMOL/L — SIGNIFICANT CHANGE UP (ref 96–108)
CO2 SERPL-SCNC: 24 MMOL/L — SIGNIFICANT CHANGE UP (ref 22–31)
CREAT SERPL-MCNC: 0.63 MG/DL — SIGNIFICANT CHANGE UP (ref 0.5–1.3)
CULTURE RESULTS: NO GROWTH — SIGNIFICANT CHANGE UP
GLUCOSE SERPL-MCNC: 100 MG/DL — HIGH (ref 70–99)
HCT VFR BLD CALC: 30.4 % — LOW (ref 39–50)
HGB BLD-MCNC: 11.2 G/DL — LOW (ref 13–17)
INR BLD: 1.57 RATIO — HIGH (ref 0.88–1.16)
MCHC RBC-ENTMCNC: 35.2 PG — HIGH (ref 27–34)
MCHC RBC-ENTMCNC: 36.8 GM/DL — HIGH (ref 32–36)
MCV RBC AUTO: 95.7 FL — SIGNIFICANT CHANGE UP (ref 80–100)
PLATELET # BLD AUTO: 71 K/UL — LOW (ref 150–400)
POTASSIUM SERPL-MCNC: 3.6 MMOL/L — SIGNIFICANT CHANGE UP (ref 3.5–5.3)
POTASSIUM SERPL-SCNC: 3.6 MMOL/L — SIGNIFICANT CHANGE UP (ref 3.5–5.3)
PROT SERPL-MCNC: 5.5 G/DL — LOW (ref 6–8.3)
PROTHROM AB SERPL-ACNC: 17.3 SEC — HIGH (ref 9.8–12.7)
RBC # BLD: 3.18 M/UL — LOW (ref 4.2–5.8)
RBC # FLD: 12.4 % — SIGNIFICANT CHANGE UP (ref 10.3–14.5)
SODIUM SERPL-SCNC: 138 MMOL/L — SIGNIFICANT CHANGE UP (ref 135–145)
SPECIMEN SOURCE: SIGNIFICANT CHANGE UP
WBC # BLD: 6.8 K/UL — SIGNIFICANT CHANGE UP (ref 3.8–10.5)
WBC # FLD AUTO: 6.8 K/UL — SIGNIFICANT CHANGE UP (ref 3.8–10.5)

## 2018-02-26 PROCEDURE — 93970 EXTREMITY STUDY: CPT | Mod: 26

## 2018-02-26 PROCEDURE — 99233 SBSQ HOSP IP/OBS HIGH 50: CPT

## 2018-02-26 PROCEDURE — 93306 TTE W/DOPPLER COMPLETE: CPT | Mod: 26

## 2018-02-26 PROCEDURE — 76705 ECHO EXAM OF ABDOMEN: CPT | Mod: 26

## 2018-02-26 RX ORDER — NADOLOL 80 MG/1
2 TABLET ORAL
Qty: 0 | Refills: 0 | COMMUNITY

## 2018-02-26 RX ORDER — NADOLOL 80 MG/1
0 TABLET ORAL
Qty: 0 | Refills: 0 | COMMUNITY

## 2018-02-26 RX ADMIN — URSODIOL 300 MILLIGRAM(S): 250 TABLET, FILM COATED ORAL at 17:24

## 2018-02-26 RX ADMIN — Medication 400 MILLIGRAM(S): at 16:47

## 2018-02-26 RX ADMIN — MORPHINE SULFATE 2 MILLIGRAM(S): 50 CAPSULE, EXTENDED RELEASE ORAL at 12:24

## 2018-02-26 RX ADMIN — MORPHINE SULFATE 2 MILLIGRAM(S): 50 CAPSULE, EXTENDED RELEASE ORAL at 13:33

## 2018-02-26 RX ADMIN — URSODIOL 300 MILLIGRAM(S): 250 TABLET, FILM COATED ORAL at 10:09

## 2018-02-26 RX ADMIN — CEFTRIAXONE 100 GRAM(S): 500 INJECTION, POWDER, FOR SOLUTION INTRAMUSCULAR; INTRAVENOUS at 10:09

## 2018-02-26 RX ADMIN — Medication 600 MILLIGRAM(S): at 06:22

## 2018-02-26 RX ADMIN — Medication 75 MILLIGRAM(S): at 06:22

## 2018-02-26 RX ADMIN — Medication 75 MILLIGRAM(S): at 17:24

## 2018-02-26 RX ADMIN — Medication 400 MILLIGRAM(S): at 15:49

## 2018-02-26 RX ADMIN — Medication 2.5 MILLIGRAM(S): at 10:09

## 2018-02-26 RX ADMIN — Medication 600 MILLIGRAM(S): at 17:24

## 2018-02-26 RX ADMIN — ENOXAPARIN SODIUM 40 MILLIGRAM(S): 100 INJECTION SUBCUTANEOUS at 17:25

## 2018-02-26 RX ADMIN — AZITHROMYCIN 255 MILLIGRAM(S): 500 TABLET, FILM COATED ORAL at 10:09

## 2018-02-26 RX ADMIN — ALBUTEROL 2.5 MILLIGRAM(S): 90 AEROSOL, METERED ORAL at 19:54

## 2018-02-26 NOTE — PROGRESS NOTE ADULT - SUBJECTIVE AND OBJECTIVE BOX
Interval Events: No new overnight event.  No N/V/D.  Tolerating diet.    HPI: Patient is a 62y old  Male who presents with a chief complaint of syncope 62 year old M with PMHx of esophageal varices, cirrhosis, Hepatitis C (treated in the past with Interferon, Ribavirin, neupogen, victrellis), hx of pleural effusions (Last ) complaining of syncope. He is complaining of fever at home ( Tmax 101.5), cough, congestion, URI, right sided chest pain, myalgias and malaise that has been worsening over the last 4 days. He denies sick contacts at home. He has been taking tylenol for the fever. He states he was in shower yesterday and he suddenly passed out. He denies head trauma, neck pain, numbness/tingling but admits to hitting his back on the tub. He doesn't remember the incident but his wife was able to help him back up. He admits to "passing out" in the past, about 6 times when he used to take Interferon for Hepatitis C. He believes that this time he must have passed out because his Blood pressure was too low while taking his Nadolol. He states he has not been eating or drinking much.     In the ED, the patient's vital signs were T: 99, HR 98, /75, R 16, SpO2 96% RA. WBC 12.1. AST slightly elevated to 40. Bilirubin 5.9. Lactate 2.8 Cardiac enzymes negative x1. Patient is positive for Influenza B. CT cervical spine: No acute fracture or traumatic subluxation. Air-fluid levels within the bilateral maxillary sinuses. Correlate clinically for acute sinusitis. Right pleural thickening, partially imaged. CT chest: Loculated medium sized right pleural effusion with pleural thickening. There is mild diffuse pleural enhancement. An empyema or neoplastic process is not excluded. Nonspecific 2.3 x 2.0 cm left hilar adenopathy. Cirrhosis with associated manifestations as detailed above. CXR: Moderate right pleural effusion. Underlying right sided pneumonia cannot be excluded. EKG shows NSR 94, left atrial enlargement. Patient given 1L NS bolus x2. Patient started on IV Zithromax, IV Ceftriaxone, Tamiflu. Blood and urine cultures pending. Repeat lactate pending.   No new overnight event.  No N/V/D.  Tolerating diet.  he was treated for hep c in the past and is here for follow up here for flu      MEDICATIONS  (STANDING):  ALBUTerol    0.083% 2.5 milliGRAM(s) Nebulizer every 12 hours  azithromycin  IVPB 500 milliGRAM(s) IV Intermittent every 24 hours  cefTRIAXone   IVPB 1 Gram(s) IV Intermittent every 24 hours  enoxaparin Injectable 40 milliGRAM(s) SubCutaneous every 24 hours  guaiFENesin  milliGRAM(s) Oral every 12 hours  oseltamivir 75 milliGRAM(s) Oral two times a day  phytonadione   Solution 2.5 milliGRAM(s) Oral daily  ursodiol Capsule 300 milliGRAM(s) Oral two times a day with meals    MEDICATIONS  (PRN):  ibuprofen  Tablet 400 milliGRAM(s) Oral daily PRN fever, temperature greater than 100.3  morphine  - Injectable 2 milliGRAM(s) IV Push every 6 hours PRN Moderate Pain (4 - 6)      Allergies    No Known Allergies    Intolerances        Review of Systems:    General:  No wt loss, fevers, chills, night sweats,fatigue,   Eyes:  Good vision, no reported pain  ENT:  No sore throat, pain, runny nose, dysphagia  CV:  No pain, palpitations, hypo/hypertension  Resp:  No dyspnea, cough, tachypnea, wheezing  GI:  No pain, No nausea, No vomiting, No diarrhea, No constipation, No weight loss, No fever, No pruritis, No rectal bleeding, No melena, No dysphagia  :  No pain, bleeding, incontinence, nocturia  Muscle:  No pain, weakness  Neuro:  No weakness, tingling, memory problems  Psych:  No fatigue, insomnia, mood problems, depression  Endocrine:  No polyuria, polydypsia, cold/heat intolerance  Heme:  No petechiae, ecchymosis, easy bruisability  Skin:  No rash, tattoos, scars, edema      Vital Signs Last 24 Hrs  T(C): 36.8 (2018 13:31), Max: 38.8 (2018 20:12)  T(F): 98.2 (2018 13:31), Max: 101.8 (2018 20:12)  HR: 90 (2018 13:31) (67 - 98)  BP: 126/75 (2018 13:31) (102/60 - 136/82)  BP(mean): --  RR: 16 (2018 13:31) (16 - 19)  SpO2: 97% (2018 13:31) (92% - 98%)    PHYSICAL EXAM:    Constitutional: NAD, well-developed  HEENT: EOMI, throat clear  Neck: No LAD, supple  Respiratory: CTA and P  Cardiovascular: S1 and S2, RRR, no M  Gastrointestinal: BS+, soft, NT/ND, neg HSM,  Extremities: No peripheral edema, neg clubing, cyanosis  Vascular: 2+ peripheral pulses  Neurological: A/O x 3, no focal deficits  Psychiatric: Normal mood, normal affect  Skin: No rashes      LABS:                        11.2   6.8   )-----------( 71       ( 2018 07:37 )             30.4     02-26    138  |  107  |  15  ----------------------------<  100<H>  3.6   |  24  |  0.63    Ca    7.7<L>      2018 07:37    TPro  5.5<L>  /  Alb  2.2<L>  /  TBili  4.2<H>  /  DBili  x   /  AST  25  /  ALT  23  /  AlkPhos  72  -26    PT/INR - ( 2018 07:37 )   PT: 17.3 sec;   INR: 1.57 ratio         PTT - ( 2018 08:59 )  PTT:38.8 sec  Urinalysis Basic - ( 2018 13:06 )    Color: Yellow / Appearance: Clear / S.005 / pH: x  Gluc: x / Ketone: Negative  / Bili: Negative / Urobili: 1   Blood: x / Protein: 25 mg/dL / Nitrite: Negative   Leuk Esterase: Trace / RBC: x / WBC occasional /HPF   Sq Epi: x / Non Sq Epi: x / Bacteria: x        RADIOLOGY & ADDITIONAL TESTS:

## 2018-02-26 NOTE — CONSULT NOTE ADULT - ASSESSMENT
History of Present Illness:  62y Male presents with SOB and found to have to loc rt pleural effusion.  Suspect chronic based on rim enhancing appearance.  H/o cirrhosis    Past Medical History  Hepatitis C  Cirrhosis  Esophageal varices      Past Surgical History  History of thoracentesis  No significant past surgical history      MEDICATIONS  (STANDING):  ALBUTerol    0.083% 2.5 milliGRAM(s) Nebulizer every 12 hours  azithromycin  IVPB 500 milliGRAM(s) IV Intermittent every 24 hours  cefTRIAXone   IVPB 1 Gram(s) IV Intermittent every 24 hours  enoxaparin Injectable 40 milliGRAM(s) SubCutaneous every 24 hours  guaiFENesin  milliGRAM(s) Oral every 12 hours  oseltamivir 75 milliGRAM(s) Oral two times a day  phytonadione   Solution 2.5 milliGRAM(s) Oral daily  ursodiol Capsule 300 milliGRAM(s) Oral two times a day with meals    MEDICATIONS  (PRN):  ibuprofen  Tablet 400 milliGRAM(s) Oral daily PRN fever, temperature greater than 100.3  morphine  - Injectable 2 milliGRAM(s) IV Push every 6 hours PRN Moderate Pain (4 - 6)    Antiplatelet therapy:                           Last dose/amt:    Allergies: No Known Allergies      SOCIAL HISTORY:  Smoker: [ ] Yes  [x ] No        PACK YEARS:                         WHEN QUIT?  ETOH use: [ ] Yes  [x ] No              FREQUENCY / QUANTITY:  Ilicit Drug use:  [ ] Yes  [x ] No  Occupation:  Live with:  Assist device use:    Relevant Family History  FAMILY HISTORY:  No pertinent family history in first degree relatives      Review of Systems neg except for HPI  GENERAL:  Fevers[] chills[] sweats[] fatigue[] weight loss[] weight gain []                                        NEURO:  parathesias[] seizures []  syncope []  confusion []                                                                                  EYES: glasses[]  blurry vision[]  discharge[] pain[] glaucoma []                                                                            ENMT:  difficulty hearing []  vertigo[]  dysphagia[] epistaxis[] recent dental work []                                      CV:  chest pain[] palpitations[] QUICK [] diaphoresis [] edema[]                                                                                             RESPIRATORY:  wheezing[] SOB[] cough [] sputum[] hemoptysis[]                                                                    GI:  nausea[]  vomiting []  diarrhea[] constipation [] melena []                                                                        : hematuria[ ]  dysuria[ ] urgency[] incontinence[]                                                                                              MUSKULOSKELETAL:  arthritis[ ]  joint swelling [ ] muscle weakness [ ]                                                                  SKIN/BREAST:  rash[ ] itching [ ]  hair loss[ ] masses[ ]                                                                                                PSYCH:  dementia [ ] depresion [ ] anxiety[ ]                                                                                                                  HEME/LYMPH:  bruises easily[ ] enlarged lymph nodes[ ] tender lymph nodes[ ]                                                 ENDOCRINE:  cold intolerance[ ] heat intolerance[ ] polydipsia[ ]                                                                              PHYSICAL EXAM  Vital Signs Last 24 Hrs  T(C): 36.9 (2018 19:54), Max: 38.7 (2018 15:28)  T(F): 98.5 (2018 19:54), Max: 101.7 (2018 15:28)  HR: 83 (2018 20:01) (67 - 105)  BP: 99/63 (2018 19:54) (99/63 - 138/83)  BP(mean): --  RR: 17 (2018 19:54) (16 - 19)  SpO2: 94% (2018 20:01) (94% - 98%)    General: Well nourished, well developed, no acute distress.                                                         Neuro: Normal exam oriented to person/place & time with no focal motor or sensory  deficits.                    Eyes: Normal exam of conjunctiva & lids, pupils equally reactive.   ENT: Normal exam of nasal/oral mucosa with absence of cyanosis.   Neck: Normal exam of jugular veins, trachea & thyroid.   Chest: decreased rt side                                                                         CV:  Auscultation: normal [x ] S3[ ] S4[ ] Irregular [ ] Rub[ ] Clicks[ ]  Murmurs none:[x ]systolic [ ]  diastolic [ ] holosystolic [ ]  Carotids: No Bruits[ ] Other____________ Abdominal Aorta: normal [ ] nonpalpable[ ]                                                                         GI: Normal exam of abdomen, liver & spleen with no noted masses or tenderness.                                                                                              Extremities: Normal no evidence of cyanosis or deformity Edema: none[ ]trace[ ]1+[ ]2+[ ]3+[ ]4+[ ]  Lower Extremity Pulses: Right[ ] Left[ ]Varicosities[ ]  SKIN : Normal exam to inspection & palation.                                                           LABS:                        11.2   6.8   )-----------( 71       ( 2018 07:37 )             30.4         138  |  107  |  15  ----------------------------<  100<H>  3.6   |  24  |  0.63    Ca    7.7<L>      2018 07:37    TPro  5.5<L>  /  Alb  2.2<L>  /  TBili  4.2<H>  /  DBili  x   /  AST  25  /  ALT  23  /  AlkPhos  72  02-    PT/INR - ( 2018 07:37 )   PT: 17.3 sec;   INR: 1.57 ratio         PTT - ( 2018 08:59 )  PTT:38.8 sec  Urinalysis Basic - ( 2018 13:06 )    Color: Yellow / Appearance: Clear / S.005 / pH: x  Gluc: x / Ketone: Negative  / Bili: Negative / Urobili: 1   Blood: x / Protein: 25 mg/dL / Nitrite: Negative   Leuk Esterase: Trace / RBC: x / WBC occasional /HPF   Sq Epi: x / Non Sq Epi: x / Bacteria: x      CARDIAC MARKERS ( 2018 08:59 )  <.015 ng/mL / x     / 59 U/L / x     / <0.5 ng/mL          CXR:  CT Chest: mod rt pleural effusion.  Appears chronic        Assessment:  62y Male presents with Febrile illness, acute SOB.  ?empyema.  Advanced cirrhosis and would rec IR drainage at this point.  Hopefully chest tube will resolve effusion.  Would be extremely high risk for surgical intervention.      Plan:

## 2018-02-26 NOTE — PROGRESS NOTE ADULT - SUBJECTIVE AND OBJECTIVE BOX
Date/Time Patient Seen:  		  Referring MD:   Data Reviewed	       Patient is a 62y old  Male who presents with a chief complaint of syncope, chills ,fever, SOB (25 Feb 2018 16:27)  in bed  seen and examined  vs and meds reviewed        Subjective/HPI     PAST MEDICAL & SURGICAL HISTORY:  Hepatitis C  Cirrhosis  Esophageal varices  History of thoracentesis  No significant past surgical history        Medication list         MEDICATIONS  (STANDING):  ALBUTerol    0.083% 2.5 milliGRAM(s) Nebulizer every 12 hours  azithromycin  IVPB 500 milliGRAM(s) IV Intermittent every 24 hours  cefTRIAXone   IVPB 1 Gram(s) IV Intermittent every 24 hours  enoxaparin Injectable 40 milliGRAM(s) SubCutaneous every 24 hours  guaiFENesin  milliGRAM(s) Oral every 12 hours  oseltamivir 75 milliGRAM(s) Oral two times a day  phytonadione   Solution 2.5 milliGRAM(s) Oral daily  ursodiol Capsule 300 milliGRAM(s) Oral two times a day with meals    MEDICATIONS  (PRN):  ibuprofen  Tablet 400 milliGRAM(s) Oral daily PRN fever, temperature greater than 100.3  morphine  - Injectable 2 milliGRAM(s) IV Push every 6 hours PRN Moderate Pain (4 - 6)         Vitals log        ICU Vital Signs Last 24 Hrs  T(C): 36.5 (26 Feb 2018 04:44), Max: 38.8 (25 Feb 2018 20:12)  T(F): 97.7 (26 Feb 2018 04:44), Max: 101.8 (25 Feb 2018 20:12)  HR: 67 (26 Feb 2018 04:44) (67 - 101)  BP: 104/66 (26 Feb 2018 04:44) (102/60 - 144/63)  BP(mean): --  ABP: --  ABP(mean): --  RR: 18 (26 Feb 2018 04:44) (16 - 19)  SpO2: 94% (26 Feb 2018 04:44) (92% - 96%)           Input and Output:  I&O's Detail      Lab Data                        14.2   12.1  )-----------( 114      ( 25 Feb 2018 08:59 )             38.7     02-25    136  |  104  |  11  ----------------------------<  134<H>  4.2   |  25  |  0.88    Ca    8.5      25 Feb 2018 08:59    TPro  7.2  /  Alb  2.8<L>  /  TBili  5.9<H>  /  DBili  x   /  AST  40<H>  /  ALT  39  /  AlkPhos  105  02-25      CARDIAC MARKERS ( 25 Feb 2018 08:59 )  <.015 ng/mL / x     / 59 U/L / x     / <0.5 ng/mL        Review of Systems	      Objective     Physical Examination    head at  heart s1s2  lungs dec BS  abd soft      Pertinent Lab findings & Imaging      Matthews:  NO   Adequate UO     I&O's Detail           Discussed with:     Cultures:	        Radiology

## 2018-02-26 NOTE — PROGRESS NOTE ADULT - PROBLEM SELECTOR PLAN 5
Bili elevated to 5.9, likely 2/2 cirrhosis. Pt states this is chronic   - Dr. Cornelius, GI, consulted  - c/w ursodiol Bili elevated to 5.9, likely 2/2 cirrhosis. Pt states this is chronic   - Dr. Cornelius, GI, consulted  - c/w ursodiol  -f/u repeat hepatitis C studies

## 2018-02-26 NOTE — PROGRESS NOTE ADULT - SUBJECTIVE AND OBJECTIVE BOX
INTERVAL HPI/OVERNIGHT EVENTS: Patient seen and examined at bedside. No acute events overnight. Spoke to and wife at bedside in regards to current medical findings. Pending evaluation by CT surgery for loculated pleural lesion/effusion.  Complains of myalgias, denies any other complaint.     MEDICATIONS  (STANDING):  ALBUTerol    0.083% 2.5 milliGRAM(s) Nebulizer every 12 hours  azithromycin  IVPB 500 milliGRAM(s) IV Intermittent every 24 hours  cefTRIAXone   IVPB 1 Gram(s) IV Intermittent every 24 hours  enoxaparin Injectable 40 milliGRAM(s) SubCutaneous every 24 hours  guaiFENesin  milliGRAM(s) Oral every 12 hours  oseltamivir 75 milliGRAM(s) Oral two times a day  phytonadione   Solution 2.5 milliGRAM(s) Oral daily  ursodiol Capsule 300 milliGRAM(s) Oral two times a day with meals    MEDICATIONS  (PRN):  ibuprofen  Tablet 400 milliGRAM(s) Oral daily PRN fever, temperature greater than 100.3  morphine  - Injectable 2 milliGRAM(s) IV Push every 6 hours PRN Moderate Pain (4 - 6)      Allergies    No Known Allergies    Intolerances        CONSTITUTIONAL: +myalgia/ weakness, no fevers or chills  RESPIRATORY: No cough, wheezing, hemoptysis; No shortness of breath  Cvs: No chest pain or palpitations  Gi: No abdominal pain. No nausea, vomiting, diarrhea or constipation. No melena or hematochezia.  Neuro: no weakness    All other review of systems is negative unless indicated above.    Vital Signs Last 24 Hrs  T(C): 36.8 (2018 13:31), Max: 38.8 (2018 20:12)  T(F): 98.2 (2018 13:31), Max: 101.8 (2018 20:12)  HR: 90 (2018 13:31) (67 - 98)  BP: 126/75 (2018 13:31) (102/60 - 136/82)  BP(mean): --  RR: 16 (2018 13:31) (16 - 19)  SpO2: 97% (2018 13:31) (92% - 98%)    General: NAD  Neurology: A&Ox3 , nonfocal  Respiratory: CTA B/L  CV: RRR, S1S2  Abdominal: Soft, NT, ND +BS  Extremities: No edema, + peripheral pulses      LABS:                        11.2   6.8   )-----------( 71       ( 2018 07:37 )             30.4         138  |  107  |  15  ----------------------------<  100<H>  3.6   |  24  |  0.63    Ca    7.7<L>      2018 07:37    TPro  5.5<L>  /  Alb  2.2<L>  /  TBili  4.2<H>  /  DBili  x   /  AST  25  /  ALT  23  /  AlkPhos  72      PT/INR - ( 2018 07:37 )   PT: 17.3 sec;   INR: 1.57 ratio         PTT - ( 2018 08:59 )  PTT:38.8 sec  Urinalysis Basic - ( 2018 13:06 )    Color: Yellow / Appearance: Clear / S.005 / pH: x  Gluc: x / Ketone: Negative  / Bili: Negative / Urobili: 1   Blood: x / Protein: 25 mg/dL / Nitrite: Negative   Leuk Esterase: Trace / RBC: x / WBC occasional /HPF   Sq Epi: x / Non Sq Epi: x / Bacteria: x        RADIOLOGY & ADDITIONAL TESTS:    < from: CT Chest w/ Oral Cont and w/ IV Cont (18 @ 10:54) >  IMPRESSION:    Loculated medium sized right pleural effusion with pleural thickening.   There is mild diffuse pleural enhancement. An empyema or neoplastic   process is not excluded.    Nonspecific 2.3 x 2.0 cm left hilar adenopathy.    Cirrhosis with associated manifestations as detailed above.                MIGDALIA GAMBLE M.D., ATTENDING RADIOLOGIST  This document has been electronically signed. 2018 11:24AM          < end of copied text >

## 2018-02-27 ENCOUNTER — RESULT REVIEW (OUTPATIENT)
Age: 63
End: 2018-02-27

## 2018-02-27 LAB
ALBUMIN SERPL ELPH-MCNC: 2.2 G/DL — LOW (ref 3.3–5)
ALP SERPL-CCNC: 85 U/L — SIGNIFICANT CHANGE UP (ref 40–120)
ALT FLD-CCNC: 23 U/L — SIGNIFICANT CHANGE UP (ref 12–78)
ANION GAP SERPL CALC-SCNC: 7 MMOL/L — SIGNIFICANT CHANGE UP (ref 5–17)
AST SERPL-CCNC: 32 U/L — SIGNIFICANT CHANGE UP (ref 15–37)
B PERT IGG+IGM PNL SER: ABNORMAL
BILIRUB SERPL-MCNC: 4.1 MG/DL — HIGH (ref 0.2–1.2)
BUN SERPL-MCNC: 13 MG/DL — SIGNIFICANT CHANGE UP (ref 7–23)
CALCIUM SERPL-MCNC: 8 MG/DL — LOW (ref 8.5–10.1)
CHLORIDE SERPL-SCNC: 105 MMOL/L — SIGNIFICANT CHANGE UP (ref 96–108)
CO2 SERPL-SCNC: 27 MMOL/L — SIGNIFICANT CHANGE UP (ref 22–31)
COLOR FLD: SIGNIFICANT CHANGE UP
CREAT SERPL-MCNC: 0.7 MG/DL — SIGNIFICANT CHANGE UP (ref 0.5–1.3)
CULTURE RESULTS: SIGNIFICANT CHANGE UP
FLUID INTAKE SUBSTANCE CLASS: SIGNIFICANT CHANGE UP
GLUCOSE FLD-MCNC: 84 MG/DL — SIGNIFICANT CHANGE UP
GLUCOSE SERPL-MCNC: 103 MG/DL — HIGH (ref 70–99)
GRAM STN FLD: SIGNIFICANT CHANGE UP
HCT VFR BLD CALC: 30.8 % — LOW (ref 39–50)
HCV RNA SERPL NAA DL=5-ACNC: SIGNIFICANT CHANGE UP IU/ML
HCV RNA SPEC NAA+PROBE-LOG IU: SIGNIFICANT CHANGE UP LOGIU/ML
HGB BLD-MCNC: 11.3 G/DL — LOW (ref 13–17)
LDH SERPL L TO P-CCNC: 234 U/L — SIGNIFICANT CHANGE UP
LEGIONELLA AG UR QL: NEGATIVE — SIGNIFICANT CHANGE UP
MCHC RBC-ENTMCNC: 35.3 PG — HIGH (ref 27–34)
MCHC RBC-ENTMCNC: 36.7 GM/DL — HIGH (ref 32–36)
MCV RBC AUTO: 96.1 FL — SIGNIFICANT CHANGE UP (ref 80–100)
NIGHT BLUE STAIN TISS: SIGNIFICANT CHANGE UP
PH FLD: 7.28 — SIGNIFICANT CHANGE UP
PLATELET # BLD AUTO: 78 K/UL — LOW (ref 150–400)
POTASSIUM SERPL-MCNC: 4.5 MMOL/L — SIGNIFICANT CHANGE UP (ref 3.5–5.3)
POTASSIUM SERPL-SCNC: 4.5 MMOL/L — SIGNIFICANT CHANGE UP (ref 3.5–5.3)
PROT FLD-MCNC: 3.1 G/DL — SIGNIFICANT CHANGE UP
PROT SERPL-MCNC: 5.8 G/DL — LOW (ref 6–8.3)
RBC # BLD: 3.21 M/UL — LOW (ref 4.2–5.8)
RBC # FLD: 12.4 % — SIGNIFICANT CHANGE UP (ref 10.3–14.5)
RCV VOL RI: HIGH /UL (ref 0–5)
S PNEUM AG UR QL: NEGATIVE — SIGNIFICANT CHANGE UP
SODIUM SERPL-SCNC: 139 MMOL/L — SIGNIFICANT CHANGE UP (ref 135–145)
SPECIMEN SOURCE: SIGNIFICANT CHANGE UP
TOTAL NUCLEATED CELL COUNT, BODY FLUID: 948 /UL — HIGH (ref 0–5)
TUBE TYPE: SIGNIFICANT CHANGE UP
WBC # BLD: 6 K/UL — SIGNIFICANT CHANGE UP (ref 3.8–10.5)
WBC # FLD AUTO: 6 K/UL — SIGNIFICANT CHANGE UP (ref 3.8–10.5)

## 2018-02-27 PROCEDURE — 88108 CYTOPATH CONCENTRATE TECH: CPT | Mod: 26

## 2018-02-27 PROCEDURE — 32555 ASPIRATE PLEURA W/ IMAGING: CPT | Mod: RT

## 2018-02-27 PROCEDURE — 99232 SBSQ HOSP IP/OBS MODERATE 35: CPT

## 2018-02-27 PROCEDURE — 99233 SBSQ HOSP IP/OBS HIGH 50: CPT

## 2018-02-27 PROCEDURE — 88305 TISSUE EXAM BY PATHOLOGIST: CPT | Mod: 26

## 2018-02-27 RX ORDER — ENOXAPARIN SODIUM 100 MG/ML
40 INJECTION SUBCUTANEOUS DAILY
Qty: 0 | Refills: 0 | Status: DISCONTINUED | OUTPATIENT
Start: 2018-02-27 | End: 2018-03-01

## 2018-02-27 RX ADMIN — ALBUTEROL 2.5 MILLIGRAM(S): 90 AEROSOL, METERED ORAL at 08:28

## 2018-02-27 RX ADMIN — Medication 600 MILLIGRAM(S): at 17:27

## 2018-02-27 RX ADMIN — URSODIOL 300 MILLIGRAM(S): 250 TABLET, FILM COATED ORAL at 17:27

## 2018-02-27 RX ADMIN — URSODIOL 300 MILLIGRAM(S): 250 TABLET, FILM COATED ORAL at 07:52

## 2018-02-27 RX ADMIN — MORPHINE SULFATE 2 MILLIGRAM(S): 50 CAPSULE, EXTENDED RELEASE ORAL at 06:07

## 2018-02-27 RX ADMIN — MORPHINE SULFATE 2 MILLIGRAM(S): 50 CAPSULE, EXTENDED RELEASE ORAL at 09:40

## 2018-02-27 RX ADMIN — MORPHINE SULFATE 2 MILLIGRAM(S): 50 CAPSULE, EXTENDED RELEASE ORAL at 02:48

## 2018-02-27 RX ADMIN — Medication 400 MILLIGRAM(S): at 15:24

## 2018-02-27 RX ADMIN — ALBUTEROL 2.5 MILLIGRAM(S): 90 AEROSOL, METERED ORAL at 19:40

## 2018-02-27 RX ADMIN — Medication 75 MILLIGRAM(S): at 06:08

## 2018-02-27 RX ADMIN — MORPHINE SULFATE 2 MILLIGRAM(S): 50 CAPSULE, EXTENDED RELEASE ORAL at 09:07

## 2018-02-27 RX ADMIN — Medication 2.5 MILLIGRAM(S): at 14:34

## 2018-02-27 RX ADMIN — Medication 600 MILLIGRAM(S): at 06:08

## 2018-02-27 RX ADMIN — Medication 75 MILLIGRAM(S): at 17:27

## 2018-02-27 RX ADMIN — CEFTRIAXONE 100 GRAM(S): 500 INJECTION, POWDER, FOR SOLUTION INTRAMUSCULAR; INTRAVENOUS at 14:33

## 2018-02-27 RX ADMIN — AZITHROMYCIN 255 MILLIGRAM(S): 500 TABLET, FILM COATED ORAL at 09:07

## 2018-02-27 RX ADMIN — ENOXAPARIN SODIUM 40 MILLIGRAM(S): 100 INJECTION SUBCUTANEOUS at 17:27

## 2018-02-27 RX ADMIN — Medication 400 MILLIGRAM(S): at 15:41

## 2018-02-27 NOTE — PROGRESS NOTE ADULT - SUBJECTIVE AND OBJECTIVE BOX
INTERVAL HPI/OVERNIGHT EVENTS: Patient seen and examined at bedside. No acute events overnight. Evaluated by CT surgery for loculated pleural lesion/effusion. Not candidate for surgical intervention. Thoracentesis by IR today.  Complains of myalgias, denies any other complaint.     MEDICATIONS  (STANDING):  ALBUTerol    0.083% 2.5 milliGRAM(s) Nebulizer every 12 hours  azithromycin  IVPB 500 milliGRAM(s) IV Intermittent every 24 hours  cefTRIAXone   IVPB 1 Gram(s) IV Intermittent every 24 hours  guaiFENesin  milliGRAM(s) Oral every 12 hours  oseltamivir 75 milliGRAM(s) Oral two times a day  phytonadione   Solution 2.5 milliGRAM(s) Oral daily  ursodiol Capsule 300 milliGRAM(s) Oral two times a day with meals    MEDICATIONS  (PRN):  ibuprofen  Tablet 400 milliGRAM(s) Oral daily PRN fever, temperature greater than 100.3  morphine  - Injectable 2 milliGRAM(s) IV Push every 6 hours PRN Moderate Pain (4 - 6)        Allergies    No Known Allergies    Intolerances        CONSTITUTIONAL: +myalgia/ weakness, no fevers or chills  RESPIRATORY: No cough, wheezing, hemoptysis; No shortness of breath  Cvs: No chest pain or palpitations  Gi: No abdominal pain. No nausea, vomiting, diarrhea or constipation. No melena or hematochezia.  Neuro: no weakness    All other review of systems is negative unless indicated above.    Vital Signs Last 24 Hrs  T(C): 37.4 (2018 08:22), Max: 38.7 (2018 15:28)  T(F): 99.3 (2018 08:22), Max: 101.7 (2018 15:28)  HR: 100 (2018 12:30) (77 - 105)  BP: 142/83 (2018 12:30) (99/63 - 143/83)  BP(mean): --  RR: 16 (2018 12:30) (16 - 19)  SpO2: 98% (2018 12:30) (94% - 98%)    General: NAD  Neurology: A&Ox3 , nonfocal  Respiratory: reduced breath sounds R side  CV: RRR, S1S2  Abdominal: Soft, NT, ND +BS  Extremities: No edema, + peripheral pulses    LABS:                        11.3   6.0   )-----------( 78       ( 2018 07:08 )             30.8         139  |  105  |  13  ----------------------------<  103<H>  4.5   |  27  |  0.70    Ca    8.0<L>      2018 07:08    TPro  5.8<L>  /  Alb  2.2<L>  /  TBili  4.1<H>  /  DBili  1.60<H>  /  AST  32  /  ALT  23  /  AlkPhos  85      PT/INR - ( 2018 07:37 )   PT: 17.3 sec;   INR: 1.57 ratio                 Urinalysis Basic - ( 2018 13:06 )    Color: Yellow / Appearance: Clear / S.005 / pH: x  Gluc: x / Ketone: Negative  / Bili: Negative / Urobili: 1   Blood: x / Protein: 25 mg/dL / Nitrite: Negative   Leuk Esterase: Trace / RBC: x / WBC occasional /HPF   Sq Epi: x / Non Sq Epi: x / Bacteria: x        RADIOLOGY & ADDITIONAL TESTS:    < from: CT Chest w/ Oral Cont and w/ IV Cont (18 @ 10:54) >  IMPRESSION:    Loculated medium sized right pleural effusion with pleural thickening.   There is mild diffuse pleural enhancement. An empyema or neoplastic   process is not excluded.    Nonspecific 2.3 x 2.0 cm left hilar adenopathy.    Cirrhosis with associated manifestations as detailed above.                MIGDALIA GAMBLE M.D., ATTENDING RADIOLOGIST  This document has been electronically signed. 2018 11:24AM          < end of copied text >

## 2018-02-27 NOTE — PROGRESS NOTE ADULT - SUBJECTIVE AND OBJECTIVE BOX
Date/Time Patient Seen:  		  Referring MD:   Data Reviewed	       Patient is a 62y old  Male who presents with a chief complaint of syncope, chills ,fever, SOB (25 Feb 2018 16:27)  planned for thoracentesis today  discussed with pt and wife      Subjective/HPI     PAST MEDICAL & SURGICAL HISTORY:  Hepatitis C  Cirrhosis  Esophageal varices  History of thoracentesis  No significant past surgical history        Medication list         MEDICATIONS  (STANDING):  ALBUTerol    0.083% 2.5 milliGRAM(s) Nebulizer every 12 hours  azithromycin  IVPB 500 milliGRAM(s) IV Intermittent every 24 hours  cefTRIAXone   IVPB 1 Gram(s) IV Intermittent every 24 hours  enoxaparin Injectable 40 milliGRAM(s) SubCutaneous every 24 hours  guaiFENesin  milliGRAM(s) Oral every 12 hours  oseltamivir 75 milliGRAM(s) Oral two times a day  phytonadione   Solution 2.5 milliGRAM(s) Oral daily  ursodiol Capsule 300 milliGRAM(s) Oral two times a day with meals    MEDICATIONS  (PRN):  ibuprofen  Tablet 400 milliGRAM(s) Oral daily PRN fever, temperature greater than 100.3  morphine  - Injectable 2 milliGRAM(s) IV Push every 6 hours PRN Moderate Pain (4 - 6)         Vitals log        ICU Vital Signs Last 24 Hrs  T(C): 36.8 (27 Feb 2018 04:45), Max: 38.7 (26 Feb 2018 15:28)  T(F): 98.3 (27 Feb 2018 04:45), Max: 101.7 (26 Feb 2018 15:28)  HR: 91 (27 Feb 2018 04:45) (77 - 105)  BP: 129/56 (27 Feb 2018 04:45) (99/63 - 138/83)  BP(mean): --  ABP: --  ABP(mean): --  RR: 19 (27 Feb 2018 04:45) (16 - 19)  SpO2: 95% (27 Feb 2018 04:45) (94% - 98%)           Input and Output:  I&O's Detail      Lab Data                        11.2   6.8   )-----------( 71       ( 26 Feb 2018 07:37 )             30.4     02-26    138  |  107  |  15  ----------------------------<  100<H>  3.6   |  24  |  0.63    Ca    7.7<L>      26 Feb 2018 07:37    TPro  5.5<L>  /  Alb  2.2<L>  /  TBili  4.2<H>  /  DBili  x   /  AST  25  /  ALT  23  /  AlkPhos  72  02-26      CARDIAC MARKERS ( 25 Feb 2018 08:59 )  <.015 ng/mL / x     / 59 U/L / x     / <0.5 ng/mL        Review of Systems	      Objective     Physical Examination  head at  heart s1s2  lungs dec BS  abd soft        Pertinent Lab findings & Imaging      Matthews:  NO   Adequate UO     I&O's Detail           Discussed with:     Cultures:	        Radiology

## 2018-02-27 NOTE — PROGRESS NOTE ADULT - SUBJECTIVE AND OBJECTIVE BOX
Interval Events: No new overnight event.  No N/V/D.  Tolerating diet.    HPI: Patient is a 62y old  Male who presents with a chief complaint of syncope 62 year old M with PMHx of esophageal varices, cirrhosis, Hepatitis C (treated in the past with Interferon, Ribavirin, neupogen, victrellis), hx of pleural effusions (Last ) complaining of syncope. He is complaining of fever at home ( Tmax 101.5), cough, congestion, URI, right sided chest pain, myalgias and malaise that has been worsening over the last 4 days. He denies sick contacts at home. He has been taking tylenol for the fever. He states he was in shower yesterday and he suddenly passed out. He denies head trauma, neck pain, numbness/tingling but admits to hitting his back on the tub. He doesn't remember the incident but his wife was able to help him back up. He admits to "passing out" in the past, about 6 times when he used to take Interferon for Hepatitis C. He believes that this time he must have passed out because his Blood pressure was too low while taking his Nadolol. He states he has not been eating or drinking much.     In the ED, the patient's vital signs were T: 99, HR 98, /75, R 16, SpO2 96% RA. WBC 12.1. AST slightly elevated to 40. Bilirubin 5.9. Lactate 2.8 Cardiac enzymes negative x1. Patient is positive for Influenza B. CT cervical spine: No acute fracture or traumatic subluxation. Air-fluid levels within the bilateral maxillary sinuses. Correlate clinically for acute sinusitis. Right pleural thickening, partially imaged. CT chest: Loculated medium sized right pleural effusion with pleural thickening. There is mild diffuse pleural enhancement. An empyema or neoplastic process is not excluded. Nonspecific 2.3 x 2.0 cm left hilar adenopathy. Cirrhosis with associated manifestations as detailed above. CXR: Moderate right pleural effusion. Underlying right sided pneumonia cannot be excluded. EKG shows NSR 94, left atrial enlargement. Patient given 1L NS bolus x2. Patient started on IV Zithromax, IV Ceftriaxone, Tamiflu. Blood and urine cultures pending. Repeat lactate pending.   No new overnight event.  No N/V/D.  Tolerating diet.  he was treated for hep c in the past and is here for follow up here for flu    MEDICATIONS  (STANDING):  ALBUTerol    0.083% 2.5 milliGRAM(s) Nebulizer every 12 hours  azithromycin  IVPB 500 milliGRAM(s) IV Intermittent every 24 hours  cefTRIAXone   IVPB 1 Gram(s) IV Intermittent every 24 hours  guaiFENesin  milliGRAM(s) Oral every 12 hours  oseltamivir 75 milliGRAM(s) Oral two times a day  phytonadione   Solution 2.5 milliGRAM(s) Oral daily  ursodiol Capsule 300 milliGRAM(s) Oral two times a day with meals    MEDICATIONS  (PRN):  ibuprofen  Tablet 400 milliGRAM(s) Oral daily PRN fever, temperature greater than 100.3  morphine  - Injectable 2 milliGRAM(s) IV Push every 6 hours PRN Moderate Pain (4 - 6)      Allergies    No Known Allergies    Intolerances        Review of Systems:    General:  No wt loss, fevers, chills, night sweats,fatigue,   Eyes:  Good vision, no reported pain  ENT:  No sore throat, pain, runny nose, dysphagia  CV:  No pain, palpitations, hypo/hypertension  Resp:  No dyspnea, cough, tachypnea, wheezing  GI:  No pain, No nausea, No vomiting, No diarrhea, No constipation, No weight loss, No fever, No pruritis, No rectal bleeding, No melena, No dysphagia  :  No pain, bleeding, incontinence, nocturia  Muscle:  No pain, weakness  Neuro:  No weakness, tingling, memory problems  Psych:  No fatigue, insomnia, mood problems, depression  Endocrine:  No polyuria, polydypsia, cold/heat intolerance  Heme:  No petechiae, ecchymosis, easy bruisability  Skin:  No rash, tattoos, scars, edema      Vital Signs Last 24 Hrs  T(C): 37.4 (2018 08:22), Max: 38.7 (2018 15:28)  T(F): 99.3 (2018 08:22), Max: 101.7 (2018 15:28)  HR: 98 (2018 08:22) (77 - 105)  BP: 127/79 (2018 08:22) (99/63 - 138/83)  BP(mean): --  RR: 17 (2018 08:22) (16 - 19)  SpO2: 95% (2018 08:22) (94% - 97%)    PHYSICAL EXAM:    Constitutional: NAD, well-developed  HEENT: EOMI, throat clear  Neck: No LAD, supple  Respiratory: CTA and P  Cardiovascular: S1 and S2, RRR, no M  Gastrointestinal: BS+, soft, NT/ND, neg HSM,  Extremities: No peripheral edema, neg clubing, cyanosis  Vascular: 2+ peripheral pulses  Neurological: A/O x 3, no focal deficits  Psychiatric: Normal mood, normal affect  Skin: No rashes      LABS:                        11.3   6.0   )-----------( 78       ( 2018 07:08 )             30.8     02-27    139  |  105  |  13  ----------------------------<  103<H>  4.5   |  27  |  0.70    Ca    8.0<L>      2018 07:08    TPro  5.8<L>  /  Alb  2.2<L>  /  TBili  4.1<H>  /  DBili  x   /  AST  32  /  ALT  23  /  AlkPhos  85  02-27    PT/INR - ( 2018 07:37 )   PT: 17.3 sec;   INR: 1.57 ratio           Urinalysis Basic - ( 2018 13:06 )    Color: Yellow / Appearance: Clear / S.005 / pH: x  Gluc: x / Ketone: Negative  / Bili: Negative / Urobili: 1   Blood: x / Protein: 25 mg/dL / Nitrite: Negative   Leuk Esterase: Trace / RBC: x / WBC occasional /HPF   Sq Epi: x / Non Sq Epi: x / Bacteria: x        RADIOLOGY & ADDITIONAL TESTS:

## 2018-02-27 NOTE — PROGRESS NOTE ADULT - PROBLEM SELECTOR PLAN 5
Bili elevated to 5.9, likely 2/2 cirrhosis. Pt states this is chronic   - Dr. Cornelius, GI, consulted  - c/w ursodiol  -f/u repeat hepatitis C studies

## 2018-02-27 NOTE — PROGRESS NOTE ADULT - SUBJECTIVE AND OBJECTIVE BOX
[facility] - Lolis Camp, Tabitha, Gian, Ivory, Kiley El  Office Number:  [phone]    Patient resting comfortably in bed in NAD.  Laying flat with no respiratory distress.  No complaints of chest pain, dyspnea, palpitations, PND, or orthopnea.    Telemetry:  Sinus rhythm    MEDICATIONS  (STANDING):  ALBUTerol    0.083% 2.5 milliGRAM(s) Nebulizer every 12 hours  azithromycin  IVPB 500 milliGRAM(s) IV Intermittent every 24 hours  cefTRIAXone   IVPB 1 Gram(s) IV Intermittent every 24 hours  guaiFENesin  milliGRAM(s) Oral every 12 hours  oseltamivir 75 milliGRAM(s) Oral two times a day  phytonadione   Solution 2.5 milliGRAM(s) Oral daily  ursodiol Capsule 300 milliGRAM(s) Oral two times a day with meals    MEDICATIONS  (PRN):  ibuprofen  Tablet 400 milliGRAM(s) Oral daily PRN fever, temperature greater than 100.3  morphine  - Injectable 2 milliGRAM(s) IV Push every 6 hours PRN Moderate Pain (4 - 6)      Allergies    No Known Allergies          Vital Signs Last 24 Hrs  T(C): 37.4 (27 Feb 2018 08:22), Max: 38.7 (26 Feb 2018 15:28)  T(F): 99.3 (27 Feb 2018 08:22), Max: 101.7 (26 Feb 2018 15:28)  HR: 98 (27 Feb 2018 08:22) (77 - 105)  BP: 127/79 (27 Feb 2018 08:22) (99/63 - 138/83)  BP(mean): --  RR: 17 (27 Feb 2018 08:22) (16 - 19)  SpO2: 95% (27 Feb 2018 08:22) (94% - 97%)    I&O's Summary      ON EXAM:    General: NAD, awake and alert, oriented x 3  HEENT: Mucous membranes are moist, anicteric  Lungs: Non-labored, breath sounds are clear bilaterally, No wheezing, rales or rhonchi  Cardiovascular: Regular, S1 and S2, no murmurs, rubs, or gallops  Gastrointestinal: Bowel Sounds present, soft, nontender.   Lymph: No peripheral edema. No lymphadenopathy.  Skin: No rashes or ulcers  Psych:  Mood & affect appropriate    LABS: All Labs Reviewed:                        11.3   6.0   )-----------( 78       ( 27 Feb 2018 07:08 )             30.8                         11.2   6.8   )-----------( 71       ( 26 Feb 2018 07:37 )             30.4                         14.2   12.1  )-----------( 114      ( 25 Feb 2018 08:59 )             38.7     27 Feb 2018 07:08    139    |  105    |  13     ----------------------------<  103    4.5     |  27     |  0.70   26 Feb 2018 07:37    138    |  107    |  15     ----------------------------<  100    3.6     |  24     |  0.63   25 Feb 2018 08:59    136    |  104    |  11     ----------------------------<  134    4.2     |  25     |  0.88     Ca    8.0        27 Feb 2018 07:08  Ca    7.7        26 Feb 2018 07:37  Ca    8.5        25 Feb 2018 08:59    TPro  5.8    /  Alb  2.2    /  TBili  4.1    /  DBili  x      /  AST  32     /  ALT  23     /  AlkPhos  85     27 Feb 2018 07:08  TPro  5.5    /  Alb  2.2    /  TBili  4.2    /  DBili  x      /  AST  25     /  ALT  23     /  AlkPhos  72     26 Feb 2018 07:37  TPro  7.2    /  Alb  2.8    /  TBili  5.9    /  DBili  x      /  AST  40     /  ALT  39     /  AlkPhos  105    25 Feb 2018 08:59    PT/INR - ( 26 Feb 2018 07:37 )   PT: 17.3 sec;   INR: 1.57 ratio               Blood Culture: Organism --  Gram Stain Blood -- Gram Stain --  Specimen Source .Nasopharyngeal Nasopharyngeal  Culture-Blood --    Organism --  Gram Stain Blood -- Gram Stain --  Specimen Source .Urine Clean Catch (Midstream)  Culture-Blood --    Organism --  Gram Stain Blood -- Gram Stain --  Specimen Source .Blood Blood-Peripheral  Culture-Blood --        02-25 @ 13:35  TSH: 1.25

## 2018-02-27 NOTE — BRIEF OPERATIVE NOTE - PROCEDURE
<<-----Click on this checkbox to enter Procedure Thoracentesis, with imaging guidance  02/27/2018  CT guided  Active  WFORMAN

## 2018-02-28 ENCOUNTER — TRANSCRIPTION ENCOUNTER (OUTPATIENT)
Age: 63
End: 2018-02-28

## 2018-02-28 DIAGNOSIS — J18.9 PNEUMONIA, UNSPECIFIED ORGANISM: ICD-10-CM

## 2018-02-28 LAB
ALBUMIN FLD-MCNC: 1.5 G/DL — SIGNIFICANT CHANGE UP
EOSINOPHIL # FLD: 1 % — SIGNIFICANT CHANGE UP
FLUID SEGMENTED GRANULOCYTES: 86 % — SIGNIFICANT CHANGE UP
LYMPHOCYTES # FLD: 10 % — SIGNIFICANT CHANGE UP
M PNEUMO IGM SER-ACNC: 166 UNITS/ML — SIGNIFICANT CHANGE UP
MONOS+MACROS # FLD: 3 % — SIGNIFICANT CHANGE UP
MYCOPLASMA AG SPEC QL: NEGATIVE — SIGNIFICANT CHANGE UP
NON-GYN CYTOLOGY SPEC: SIGNIFICANT CHANGE UP

## 2018-02-28 PROCEDURE — 99233 SBSQ HOSP IP/OBS HIGH 50: CPT

## 2018-02-28 PROCEDURE — 99232 SBSQ HOSP IP/OBS MODERATE 35: CPT

## 2018-02-28 RX ORDER — KETOROLAC TROMETHAMINE 30 MG/ML
15 SYRINGE (ML) INJECTION DAILY
Qty: 0 | Refills: 0 | Status: DISCONTINUED | OUTPATIENT
Start: 2018-02-28 | End: 2018-03-01

## 2018-02-28 RX ADMIN — Medication 15 MILLIGRAM(S): at 12:00

## 2018-02-28 RX ADMIN — Medication 75 MILLIGRAM(S): at 17:14

## 2018-02-28 RX ADMIN — Medication 15 MILLIGRAM(S): at 11:11

## 2018-02-28 RX ADMIN — Medication 2.5 MILLIGRAM(S): at 11:11

## 2018-02-28 RX ADMIN — ALBUTEROL 2.5 MILLIGRAM(S): 90 AEROSOL, METERED ORAL at 07:22

## 2018-02-28 RX ADMIN — URSODIOL 300 MILLIGRAM(S): 250 TABLET, FILM COATED ORAL at 17:14

## 2018-02-28 RX ADMIN — Medication 600 MILLIGRAM(S): at 05:58

## 2018-02-28 RX ADMIN — AZITHROMYCIN 255 MILLIGRAM(S): 500 TABLET, FILM COATED ORAL at 08:42

## 2018-02-28 RX ADMIN — CEFTRIAXONE 100 GRAM(S): 500 INJECTION, POWDER, FOR SOLUTION INTRAMUSCULAR; INTRAVENOUS at 08:42

## 2018-02-28 RX ADMIN — URSODIOL 300 MILLIGRAM(S): 250 TABLET, FILM COATED ORAL at 07:52

## 2018-02-28 RX ADMIN — MORPHINE SULFATE 2 MILLIGRAM(S): 50 CAPSULE, EXTENDED RELEASE ORAL at 02:42

## 2018-02-28 RX ADMIN — ALBUTEROL 2.5 MILLIGRAM(S): 90 AEROSOL, METERED ORAL at 20:30

## 2018-02-28 RX ADMIN — Medication 600 MILLIGRAM(S): at 17:14

## 2018-02-28 RX ADMIN — ENOXAPARIN SODIUM 40 MILLIGRAM(S): 100 INJECTION SUBCUTANEOUS at 11:11

## 2018-02-28 RX ADMIN — Medication 75 MILLIGRAM(S): at 05:59

## 2018-02-28 NOTE — PROGRESS NOTE ADULT - PROBLEM SELECTOR PROBLEM 4
ACP (advance care planning)
ACP (advance care planning)
Lactate blood increased

## 2018-02-28 NOTE — DISCHARGE NOTE ADULT - ADDITIONAL INSTRUCTIONS
Follow up with your PMD Dr Mckeon as scheduled  Follow up with pulmonologist and GI Dr. Rajput within 2-3 weeks of discharge  You will need to get a repeat Chest Xray 6 weeks

## 2018-02-28 NOTE — DISCHARGE NOTE ADULT - CARE PROVIDER_API CALL
Mustapha Mckeon), Internal Medicine  Internal Medicine  [address]  [place] [address]  Phone: [phone]  Fax: [phone]    Sissy Vásquez), Critical Care Medicine; Internal Medicine; Pulmonary Disease  [address]  [place] [address]  Phone: [phone]  Fax: [phone]

## 2018-02-28 NOTE — PROGRESS NOTE ADULT - ASSESSMENT
62 year old M with PMHx of esophageal varices from Hep C cirrhosis presents with syncope.     - His syncope is likely from dehydration and increased vagal tone.    - Pleural effusion may be from underlying infection.   - S/P thoracentesis 685mL clear orange fluid. Final images: ?pulm bleb, rt base. No definite ptx.  - Doubt HF  - No signs of ischemia  - Echo normal LV systolic function normal right ventricular function no pericardial effusion   - Monitor for bleeding given varices  - Monitor and replete electrolytes. Keep K>4.0 and Mg>2.0.   - Further cardiac workup will depend on clinical course.   - All other workup per primary team. Will followup. 62 year old M with PMHx of esophageal varices from Hep C cirrhosis presents with syncope.     - His syncope is likely from dehydration and increased vagal tone.    - Pleural effusion may be from underlying infection.   - S/P thoracentesis 685mL clear orange fluid. Final images: ?pulm bleb, rt base. No definite ptx.  - Doubt CHF  - No signs of ischemia  - Echo normal LV systolic function normal right ventricular function no pericardial effusion   - Monitor for bleeding given varices. Was on nadolol at home.  - Monitor and replete electrolytes. Keep K>4.0 and Mg>2.0.   - Further cardiac workup will depend on clinical course.   - All other workup per primary team. Will followup.

## 2018-02-28 NOTE — PROGRESS NOTE ADULT - PROBLEM SELECTOR PLAN 4
Advanced care planning was discussed with patient and family.  Advanced care planning forms were reviewed and discussed.  Risks, benefits and alternatives of gastroenterologic procedures were discussed in detail and all questions were answered.    25 minutes spent.
Advanced care planning was discussed with patient and family.  Advanced care planning forms were reviewed and discussed.  Risks, benefits and alternatives of gastroenterologic procedures were discussed in detail and all questions were answered.    25 minutes spent.
in setting of infectious process  improved

## 2018-02-28 NOTE — PROGRESS NOTE ADULT - PROBLEM SELECTOR PLAN 2
check lfts  cw ursodiol 300 bid
check lfts-- now normal   cw ursodiol 300 bid
Hx of pleural effusions with new pleural effusion as seen on CT chest: Loculated medium sized right pleural effusion with pleural thickening. There is mild diffuse pleural enhancement.   - Albuterol nebs q12 hours  - Patient had pleural fluid drained last in 2012.   - continue supplemental O2 as needed  -Pulm following, appreciate recs  -continue Incentive spirometry   -Cardiothoracic surgery Dr Anup Negrete consulted, appreciate further recs if pt warrants VATs
Hx of pleural effusions with new pleural effusion as seen on CT chest: Loculated medium sized right pleural effusion with pleural thickening. There is mild diffuse pleural enhancement.   - Albuterol nebs q12 hours  - Patient had pleural fluid drained last in 2012.   - continue supplemental O2 as needed  -Pulm following, appreciate recs  -continue Incentive spirometry   -Cardiothoracic surgery Dr Anup Negrete consulted,patient high risk for surgical intervention  -Will plan for thoracentesis  -F/u pleural fluid studies
s/p thoracentesis  -Hx of pleural effusions with new pleural effusion as seen on CT chest: Loculated medium sized right pleural effusion with pleural thickening. There is mild diffuse pleural enhancement.   - Albuterol nebs q12 hours  - Patient had pleural fluid drained last in 2012.   - continue supplemental O2 as needed  -Pulm following, appreciate recs  -continue Incentive spirometry   -Cardiothoracic surgery Dr Anup Negrete consulted,patient high risk for surgical intervention  -F/u pleural fluid studies
check lfts  f/u indirect/direct bili  cw ursodiol 300 bid

## 2018-02-28 NOTE — PROGRESS NOTE ADULT - SUBJECTIVE AND OBJECTIVE BOX
Interval Events: No new overnight event.  No N/V/D.  Tolerating diet.    HPI: Patient is a 62y old  Male who presents with a chief complaint of syncope 62 year old M with PMHx of esophageal varices, cirrhosis, Hepatitis C (treated in the past with Interferon, Ribavirin, neupogen, victrellis), hx of pleural effusions (Last 2012) complaining of syncope. He is complaining of fever at home ( Tmax 101.5), cough, congestion, URI, right sided chest pain, myalgias and malaise that has been worsening over the last 4 days. He denies sick contacts at home. He has been taking tylenol for the fever. He states he was in shower yesterday and he suddenly passed out. He denies head trauma, neck pain, numbness/tingling but admits to hitting his back on the tub. He doesn't remember the incident but his wife was able to help him back up. He admits to "passing out" in the past, about 6 times when he used to take Interferon for Hepatitis C. He believes that this time he must have passed out because his Blood pressure was too low while taking his Nadolol. He states he has not been eating or drinking much.     In the ED, the patient's vital signs were T: 99, HR 98, /75, R 16, SpO2 96% RA. WBC 12.1. AST slightly elevated to 40. Bilirubin 5.9. Lactate 2.8 Cardiac enzymes negative x1. Patient is positive for Influenza B. CT cervical spine: No acute fracture or traumatic subluxation. Air-fluid levels within the bilateral maxillary sinuses. Correlate clinically for acute sinusitis. Right pleural thickening, partially imaged. CT chest: Loculated medium sized right pleural effusion with pleural thickening. There is mild diffuse pleural enhancement. An empyema or neoplastic process is not excluded. Nonspecific 2.3 x 2.0 cm left hilar adenopathy. Cirrhosis with associated manifestations as detailed above. CXR: Moderate right pleural effusion. Underlying right sided pneumonia cannot be excluded. EKG shows NSR 94, left atrial enlargement. Patient given 1L NS bolus x2. Patient started on IV Zithromax, IV Ceftriaxone, Tamiflu. Blood and urine cultures pending. Repeat lactate pending.   No new overnight event.  No N/V/D.  Tolerating diet.  he was treated for hep c in the past and is here for follow up here for flu      MEDICATIONS  (STANDING):  ALBUTerol    0.083% 2.5 milliGRAM(s) Nebulizer every 12 hours  azithromycin  IVPB 500 milliGRAM(s) IV Intermittent every 24 hours  cefTRIAXone   IVPB 1 Gram(s) IV Intermittent every 24 hours  enoxaparin Injectable 40 milliGRAM(s) SubCutaneous daily  guaiFENesin  milliGRAM(s) Oral every 12 hours  oseltamivir 75 milliGRAM(s) Oral two times a day  phytonadione   Solution 2.5 milliGRAM(s) Oral daily  ursodiol Capsule 300 milliGRAM(s) Oral two times a day with meals    MEDICATIONS  (PRN):  ibuprofen  Tablet 400 milliGRAM(s) Oral daily PRN fever, temperature greater than 100.3  ketorolac   Injectable 15 milliGRAM(s) IV Push daily PRN Moderate Pain (4 - 6)  morphine  - Injectable 2 milliGRAM(s) IV Push every 6 hours PRN Moderate Pain (4 - 6)      Allergies    No Known Allergies    Intolerances        Review of Systems:    General:  No wt loss, fevers, chills, night sweats,fatigue,   Eyes:  Good vision, no reported pain  ENT:  No sore throat, pain, runny nose, dysphagia  CV:  No pain, palpitations, hypo/hypertension  Resp:  No dyspnea, cough, tachypnea, wheezing  GI:  No pain, No nausea, No vomiting, No diarrhea, No constipation, No weight loss, No fever, No pruritis, No rectal bleeding, No melena, No dysphagia  :  No pain, bleeding, incontinence, nocturia  Muscle:  No pain, weakness  Neuro:  No weakness, tingling, memory problems  Psych:  No fatigue, insomnia, mood problems, depression  Endocrine:  No polyuria, polydypsia, cold/heat intolerance  Heme:  No petechiae, ecchymosis, easy bruisability  Skin:  No rash, tattoos, scars, edema      Vital Signs Last 24 Hrs  T(C): 37.1 (28 Feb 2018 07:51), Max: 38.7 (27 Feb 2018 15:22)  T(F): 98.7 (28 Feb 2018 07:51), Max: 101.6 (27 Feb 2018 15:22)  HR: 90 (28 Feb 2018 07:51) (80 - 103)  BP: 123/74 (28 Feb 2018 07:51) (107/69 - 123/74)  BP(mean): --  RR: 17 (28 Feb 2018 07:51) (17 - 17)  SpO2: 93% (28 Feb 2018 07:51) (92% - 95%)    PHYSICAL EXAM:    Constitutional: NAD, well-developed  HEENT: EOMI, throat clear  Neck: No LAD, supple  Respiratory: CTA and P  Cardiovascular: S1 and S2, RRR, no M  Gastrointestinal: BS+, soft, NT/ND, neg HSM,  Extremities: No peripheral edema, neg clubing, cyanosis  Vascular: 2+ peripheral pulses  Neurological: A/O x 3, no focal deficits  Psychiatric: Normal mood, normal affect  Skin: No rashes      LABS:                        11.3   6.0   )-----------( 78       ( 27 Feb 2018 07:08 )             30.8     02-27    139  |  105  |  13  ----------------------------<  103<H>  4.5   |  27  |  0.70    Ca    8.0<L>      27 Feb 2018 07:08    TPro  5.8<L>  /  Alb  2.2<L>  /  TBili  4.1<H>  /  DBili  1.60<H>  /  AST  32  /  ALT  23  /  AlkPhos  85  02-27          RADIOLOGY & ADDITIONAL TESTS:

## 2018-02-28 NOTE — DISCHARGE NOTE ADULT - CARE PLAN
Principal Discharge DX:	Influenza B  Goal:	stable  Assessment and plan of treatment:	Finish last dose of tamiflu   Continue conservative management with ibuprofen as needed for low grade fever  Hydration encouraged  Secondary Diagnosis:	PNA (pneumonia)  Assessment and plan of treatment:	Complete course of antibiotics, Ceftin 500mg twice a day for 6 more days  Secondary Diagnosis:	Pleural effusion  Assessment and plan of treatment:	You had a thoracentesis, cytology was negative, cultures so far negative   pleural effusion most likely related to Cirrhosis  You will need follow up as outpatient with a Pulmonologist  You will need Chest Xray in 6 weeks.  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

## 2018-02-28 NOTE — PROGRESS NOTE ADULT - SUBJECTIVE AND OBJECTIVE BOX
Date/Time Patient Seen:  		  Referring MD:   Data Reviewed	       Patient is a 62y old  Male who presents with a chief complaint of syncope, chills ,fever, SOB (25 Feb 2018 16:27)  in bed  seen and examined  vs and meds reviewed  on ABX  on Tamiflu  s/p thoracentesis      Subjective/HPI     PAST MEDICAL & SURGICAL HISTORY:  Hepatitis C  Cirrhosis  Esophageal varices  History of thoracentesis  No significant past surgical history        Medication list         MEDICATIONS  (STANDING):  ALBUTerol    0.083% 2.5 milliGRAM(s) Nebulizer every 12 hours  azithromycin  IVPB 500 milliGRAM(s) IV Intermittent every 24 hours  cefTRIAXone   IVPB 1 Gram(s) IV Intermittent every 24 hours  enoxaparin Injectable 40 milliGRAM(s) SubCutaneous daily  guaiFENesin  milliGRAM(s) Oral every 12 hours  oseltamivir 75 milliGRAM(s) Oral two times a day  phytonadione   Solution 2.5 milliGRAM(s) Oral daily  ursodiol Capsule 300 milliGRAM(s) Oral two times a day with meals    MEDICATIONS  (PRN):  ibuprofen  Tablet 400 milliGRAM(s) Oral daily PRN fever, temperature greater than 100.3  morphine  - Injectable 2 milliGRAM(s) IV Push every 6 hours PRN Moderate Pain (4 - 6)         Vitals log        ICU Vital Signs Last 24 Hrs  T(C): 36.9 (28 Feb 2018 04:19), Max: 38.7 (27 Feb 2018 15:22)  T(F): 98.5 (28 Feb 2018 04:19), Max: 101.6 (27 Feb 2018 15:22)  HR: 88 (28 Feb 2018 04:19) (80 - 103)  BP: 122/74 (28 Feb 2018 04:19) (107/69 - 143/83)  BP(mean): --  ABP: --  ABP(mean): --  RR: 17 (28 Feb 2018 04:19) (16 - 18)  SpO2: 92% (28 Feb 2018 04:19) (92% - 98%)           Input and Output:  I&O's Detail    27 Feb 2018 07:01  -  28 Feb 2018 06:02  --------------------------------------------------------  IN:    IV PiggyBack: 350 mL  Total IN: 350 mL    OUT:  Total OUT: 0 mL    Total NET: 350 mL          Lab Data                        11.3   6.0   )-----------( 78       ( 27 Feb 2018 07:08 )             30.8     02-27    139  |  105  |  13  ----------------------------<  103<H>  4.5   |  27  |  0.70    Ca    8.0<L>      27 Feb 2018 07:08    TPro  5.8<L>  /  Alb  2.2<L>  /  TBili  4.1<H>  /  DBili  1.60<H>  /  AST  32  /  ALT  23  /  AlkPhos  85  02-27            Review of Systems	      Objective     Physical Examination    head at  heart s1s2  lungs dec BS  feels better      Pertinent Lab findings & Imaging      [place]ron:  NO   Adequate UO     I&O's Detail    27 Feb 2018 07:01  -  28 Feb 2018 06:02  --------------------------------------------------------  IN:    IV PiggyBack: 350 mL  Total IN: 350 mL    OUT:  Total OUT: 0 mL    Total NET: 350 mL               Discussed with:     Cultures:	        Radiology

## 2018-02-28 NOTE — PROGRESS NOTE ADULT - PROBLEM SELECTOR PROBLEM 2
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
Pleural effusion
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

## 2018-02-28 NOTE — DISCHARGE NOTE ADULT - PLAN OF CARE
stable Finish last dose of tamiflu   Continue conservative management with ibuprofen as needed for low grade fever  Hydration encouraged Complete course of antibiotics, Ceftin 500mg twice a day for 6 more days You had a thoracentesis, cytology was negative, cultures so far negative   pleural effusion most likely related to Cirrhosis  You will need follow up as outpatient with a Pulmonologist  You will need Chest Xray in 6 weeks. Continue home nadolol  Started on ursodiol for elevated bilirubin  Follow up with your Gastroenterologists/Cirrhosis specialist Dr Jose E Rajput at [facility] as scheduled

## 2018-02-28 NOTE — PROGRESS NOTE ADULT - SUBJECTIVE AND OBJECTIVE BOX
HPI:  62 year old M with PMHx of esophageal varices, cirrhosis, Hepatitis C (treated in the past with Interferon, Ribavirin, neupogen, victrellis), hx of pleural effusions (Last ) complaining of syncope. He is complaining of fever at home ( Tmax 101.5), cough, congestion, URI, right sided chest pain, myalgias and malaise that has been worsening over the last 4 days. He denies sick contacts at home. He has been taking tylenol for the fever. He states he was in shower yesterday and he suddenly passed out. He denies head trauma, neck pain, numbness/tingling but admits to hitting his back on the tub. He doesn't remember the incident but his wife was able to help him back up. He admits to "passing out" in the past, about 6 times when he used to take Interferon for Hepatitis C. He believes that this time he must have passed out because his Blood pressure was too low while taking his Nadolol. He states he has not been eating or drinking much.     In the ED, the patient's vital signs were T: 99, HR 98, /75, R 16, SpO2 96% RA. WBC 12.1. AST slightly elevated to 40. Bilirubin 5.9. Lactate 2.8 Cardiac enzymes negative x1. Patient is positive for Influenza B. CT cervical spine: No acute fracture or traumatic subluxation. Air-fluid levels within the bilateral maxillary sinuses. Correlate clinically for acute sinusitis. Right pleural thickening, partially imaged. CT chest: Loculated medium sized right pleural effusion with pleural thickening. There is mild diffuse pleural enhancement. An empyema or neoplastic process is not excluded. Nonspecific 2.3 x 2.0 cm left hilar adenopathy. Cirrhosis with associated manifestations as detailed above. CXR: Moderate right pleural effusion. Underlying right sided pneumonia cannot be excluded. EKG shows NSR 94, left atrial enlargement. Patient given 1L NS bolus x2. Patient started on IV Zithromax, IV Ceftriaxone, Tamiflu. Blood and urine cultures pending. Repeat lactate pending. (2018 11:34)      SUBJECTIVE: Pt. resting comfortably in bed in NAD, lying flat with no respiratory distress, no c/o chest pain, dyspnea, palpitations, PND, or orthopnea   Patient is a 62y old  Male who presents with a chief complaint of syncope, chills ,fever, SOB (2018 16:27)          OBJECTIVE:  Review Of Systems:  Constitutional: [ ] Fever [ ] Chills [ ] Fatigue [ ] Weight change   HEENT: [ ] Blurred vision [ ] Eye Pain [ ] Headache [ ] Runny nose [ ] Sore Throat   Respiratory: [ ] Cough [ ] Wheezing [ ] Shortness of breath  Cardiovascular: [ ] Chest Pain [ ] Palpitations [ ] QUICK [ ] PND [ ] Orthopnea  Gastrointestinal: [ ] Abdominal Pain [ ] Diarrhea [ ] Constipation [ ] Hemorrhoids [ ] Nausea [ ] Vomiting  Genitourinary: [ ] Nocturia [ ] Dysuria [ ] Incontinence  Extremities: [ ] Swelling [ ] Joint Pain  Neurologic: [ ] Focal deficit [ ] Paresthesias [ ] Syncope  Lymphatic: [ ] Swelling [ ] Lymphadenopathy   Skin: [ ] Rash [ ] Ecchymoses [ ] Wounds [ ] Lesions  Psychiatry: [ ] Depression [ ] Suicidal/Homicidal Ideation [ ] Anxiety [ ] Sleep Disturbances  [ ] 10 point review of systems is otherwise negative except as mentioned above              [ ]Unable to obtain    Allergy:  No Known Allergies    Medications:  MEDICATIONS  (STANDING):  ALBUTerol    0.083% 2.5 milliGRAM(s) Nebulizer every 12 hours  azithromycin  IVPB 500 milliGRAM(s) IV Intermittent every 24 hours  cefTRIAXone   IVPB 1 Gram(s) IV Intermittent every 24 hours  enoxaparin Injectable 40 milliGRAM(s) SubCutaneous daily  guaiFENesin  milliGRAM(s) Oral every 12 hours  oseltamivir 75 milliGRAM(s) Oral two times a day  phytonadione   Solution 2.5 milliGRAM(s) Oral daily  ursodiol Capsule 300 milliGRAM(s) Oral two times a day with meals    MEDICATIONS  (PRN):  ibuprofen  Tablet 400 milliGRAM(s) Oral daily PRN fever, temperature greater than 100.3  ketorolac   Injectable 15 milliGRAM(s) IV Push daily PRN Moderate Pain (4 - 6)  morphine  - Injectable 2 milliGRAM(s) IV Push every 6 hours PRN Moderate Pain (4 - 6)      PMH/PSH/FH/SH: [ ] Unchanged    Vitals:  T(C): 37.1 (18 @ 07:51), Max: 38.7 (18 @ 15:22)  HR: 90 (18 @ 07:51) (80 - 103)  BP: 123/74 (18 @ 07:51) (107/69 - 142/83)  BP(mean): --  RR: 17 (18 @ 07:51) (16 - 18)  SpO2: 93% (18 @ 07:51) (92% - 98%)  Wt(kg): --  Daily     Daily Weight in k.3 (2018 04:19)  I&O's Summary    2018 07:01  -  2018 07:00  --------------------------------------------------------  IN: 350 mL / OUT: 0 mL / NET: 350 mL    Lab:      ECG:    Echo:  < from: TTE Echo Doppler w/o Cont (18 @ 10:24) >  INDINGS  Left Ventricle: The endocardium is not well-visualized. Grossly, normal   left ventricular systolic function.  Aortic Valve: Not well visualized. Appears calcified.  Mitral Valve: Mitral annular calcification and calcified mitral valve   leaflets. Minimal mitral insufficiency.  Left Atrium: Grossly normal  Right Ventricle: Not well visualized. Grossly, normal right ventricular   size and systolic function.  Right Atrium: Grossly normal  Pericardium/Pleura: Normal pericardium with no pericardial effusion.    Stress Testing:     Cath:    Imaging:    Interpretation of Telemetry: Pt. is not on telemetry       Physical Exam:  Appearance: [ ] Normal  [ ] abnormal [ ] NAD   Eyes: [ ] PERRL [ ] EOMI  HENT: [ ] Normal [ ] Abnormal oral mucosa [ ]NC/AT  Cardiovascular: [ ] S1 [ ] S2 [ ] RRR [ ] m/r/g [ ]edema [ ] JVP  Procedural Access Site: [ ]  hematoma [ ] tender to palpation [ ] 2+ pulse [ ] bruit [ ] Ecchymosis  Respiratory: [ ] Clear to auscultation bilaterally  Gastrointestinal: [ ] Soft [ ] tenderness[ ] distension [ ] BS  Musculoskeletal: [ ] clubbing [ ] joint deformity   Neurologic: [ ] Non-focal  Lymphatic: [ ] lymphadenopathy  Psychiatry: [ ] AAOx3  [ ] confused [ ] disoriented [ ] Mood & affect appropriate  Skin: [ ]  rashes [ ] ecchymoses [ ] cyanosis HPI:  62 year old M with PMHx of esophageal varices, cirrhosis, Hepatitis C (treated in the past with Interferon, Ribavirin, neupogen, victrellis), hx of pleural effusions (Last ) complaining of syncope. He is complaining of fever at home ( Tmax 101.5), cough, congestion, URI, right sided chest pain, myalgias and malaise that has been worsening over the last 4 days. He denies sick contacts at home. He has been taking tylenol for the fever. He states he was in shower yesterday and he suddenly passed out. He denies head trauma, neck pain, numbness/tingling but admits to hitting his back on the tub. He doesn't remember the incident but his wife was able to help him back up. He admits to "passing out" in the past, about 6 times when he used to take Interferon for Hepatitis C. He believes that this time he must have passed out because his Blood pressure was too low while taking his Nadolol. He states he has not been eating or drinking much.     In the ED, the patient's vital signs were T: 99, HR 98, /75, R 16, SpO2 96% RA. WBC 12.1. AST slightly elevated to 40. Bilirubin 5.9. Lactate 2.8 Cardiac enzymes negative x1. Patient is positive for Influenza B. CT cervical spine: No acute fracture or traumatic subluxation. Air-fluid levels within the bilateral maxillary sinuses. Correlate clinically for acute sinusitis. Right pleural thickening, partially imaged. CT chest: Loculated medium sized right pleural effusion with pleural thickening. There is mild diffuse pleural enhancement. An empyema or neoplastic process is not excluded. Nonspecific 2.3 x 2.0 cm left hilar adenopathy. Cirrhosis with associated manifestations as detailed above. CXR: Moderate right pleural effusion. Underlying right sided pneumonia cannot be excluded. EKG shows NSR 94, left atrial enlargement. Patient given 1L NS bolus x2. Patient started on IV Zithromax, IV Ceftriaxone, Tamiflu. Blood and urine cultures pending. Repeat lactate pending. (2018 11:34)      SUBJECTIVE: Pt. resting comfortably in bed in NAD, with no respiratory distress, no c/o chest pain, dyspnea, palpitations, PND, or orthopnea wife at bedside   Patient is a 62y old  Male who presents with a chief complaint of syncope, chills ,fever, SOB (2018 16:27)      OBJECTIVE:  Review Of Systems:  Constitutional: [ ] Fever [ ] Chills [ ] Fatigue [ ] Weight change   HEENT: [ ] Blurred vision [ ] Eye Pain [ ] Headache [ ] Runny nose [ ] Sore Throat   Respiratory: [ ] Cough [ ] Wheezing [ ] Shortness of breath  Cardiovascular: [ ] Chest Pain [ ] Palpitations [ ] QUICK [ ] PND [ ] Orthopnea  Gastrointestinal: [ ] Abdominal Pain [ ] Diarrhea [ ] Constipation [ ] Hemorrhoids [ ] Nausea [ ] Vomiting  Genitourinary: [ ] Nocturia [ ] Dysuria [ ] Incontinence  Extremities: [ ] Swelling [ ] Joint Pain  Neurologic: [ ] Focal deficit [ ] Paresthesias [ ] Syncope  Lymphatic: [ ] Swelling [ ] Lymphadenopathy   Skin: [ ] Rash [ ] Ecchymoses [ ] Wounds [ ] Lesions  Psychiatry: [ ] Depression [ ] Suicidal/Homicidal Ideation [ ] Anxiety [ ] Sleep Disturbances  [X ] 10 point review of systems is otherwise negative except as mentioned above              [ ]Unable to obtain    Allergy:  No Known Allergies    Medications:  MEDICATIONS  (STANDING):  ALBUTerol    0.083% 2.5 milliGRAM(s) Nebulizer every 12 hours  azithromycin  IVPB 500 milliGRAM(s) IV Intermittent every 24 hours  cefTRIAXone   IVPB 1 Gram(s) IV Intermittent every 24 hours  enoxaparin Injectable 40 milliGRAM(s) SubCutaneous daily  guaiFENesin  milliGRAM(s) Oral every 12 hours  oseltamivir 75 milliGRAM(s) Oral two times a day  phytonadione   Solution 2.5 milliGRAM(s) Oral daily  ursodiol Capsule 300 milliGRAM(s) Oral two times a day with meals    MEDICATIONS  (PRN):  ibuprofen  Tablet 400 milliGRAM(s) Oral daily PRN fever, temperature greater than 100.3  ketorolac   Injectable 15 milliGRAM(s) IV Push daily PRN Moderate Pain (4 - 6)  morphine  - Injectable 2 milliGRAM(s) IV Push every 6 hours PRN Moderate Pain (4 - 6)      PMH/PSH/FH/SH: [ ] Unchanged    Vitals:  T(C): 37.1 (18 @ 07:51), Max: 38.7 (18 @ 15:22)  HR: 90 (18 @ 07:51) (80 - 103)  BP: 123/74 (18 @ 07:51) (107/69 - 142/83)  BP(mean): --  RR: 17 (18 @ 07:51) (16 - 18)  SpO2: 93% (18 @ 07:51) (92% - 98%)  Wt(kg): --  Daily     Daily Weight in k.3 (2018 04:19)  I&O's Summary    2018 07:01  -  2018 07:00  --------------------------------------------------------  IN: 350 mL / OUT: 0 mL / NET: 350 mL    Lab:      ECG:    Echo:  < from: TTE Echo Doppler w/o Cont (18 @ 10:24) >  INDINGS  Left Ventricle: The endocardium is not well-visualized. Grossly, normal   left ventricular systolic function.  Aortic Valve: Not well visualized. Appears calcified.  Mitral Valve: Mitral annular calcification and calcified mitral valve   leaflets. Minimal mitral insufficiency.  Left Atrium: Grossly normal  Right Ventricle: Not well visualized. Grossly, normal right ventricular   size and systolic function.  Right Atrium: Grossly normal  Pericardium/Pleura: Normal pericardium with no pericardial effusion.    Stress Testing:     Cath:    Imaging:    Interpretation of Telemetry: Pt. is not on telemetry       Physical Exam:  Appearance: [ ] Normal  [ ] abnormal [X ] NAD   Eyes: [ ] PERRL [ ] EOMI  HENT: [ ] Normal [ ] Abnormal oral mucosa [ ]NC/AT  Cardiovascular: [X ] S1 [X ] S2 [ ] RRR [ ] m/r/g [ ]edema [ ] JVP  Procedural Access Site: [ ]  hematoma [ ] tender to palpation [ ] 2+ pulse [ ] bruit [ ] Ecchymosis  Respiratory: [X] Clear to auscultation bilaterally  Gastrointestinal: [ ] Soft [ ] tenderness[ ] distension [ ] BS  Musculoskeletal: [ ] clubbing [ ] joint deformity   Neurologic: [ ] Non-focal  Lymphatic: [ ] lymphadenopathy  Psychiatry: [X ] AAOx3  [ ] confused [ ] disoriented [ ] Mood & affect appropriate  Skin: [ ]  rashes [ ] ecchymoses [ ] cyanosis

## 2018-02-28 NOTE — DISCHARGE NOTE ADULT - HOSPITAL COURSE
62 year old M with PMHx of esophageal varices, cirrhosis, Hepatitis C (treated in the past with Interferon, Ribavirin, neupogen, victrellis), hx of pleural effusions (Last 2012) complaining of syncope. He is complaining of fever at home ( Tmax 101.5), cough, congestion, URI, right sided chest pain, myalgias and malaise that has been worsening over the last 4 days. He denies sick contacts at home. He has been taking tylenol for the fever. He states he was in shower yesterday and he suddenly passed out. He denies head trauma, neck pain, numbness/tingling but admits to hitting his back on the tub. He doesn't remember the incident but his wife was able to help him back up. He admits to "passing out" in the past, about 6 times when he used to take Interferon for Hepatitis C. He believes that this time he must have passed out because his Blood pressure was too low while taking his Nadolol. He states he has not been eating or drinking much.     In the ED, the patient's vital signs were T: 99, HR 98, /75, R 16, SpO2 96% RA. WBC 12.1. AST slightly elevated to 40. Bilirubin 5.9. Lactate 2.8 Cardiac enzymes negative x1. Patient is positive for Influenza B. CT cervical spine: No acute fracture or traumatic subluxation. Air-fluid levels within the bilateral maxillary sinuses. Correlate clinically for acute sinusitis. Right pleural thickening, partially imaged. CT chest: Loculated medium sized right pleural effusion with pleural thickening. There is mild diffuse pleural enhancement. An empyema or neoplastic process is not excluded. Nonspecific 2.3 x 2.0 cm left hilar adenopathy. Cirrhosis with associated manifestations as detailed above. CXR: Moderate right pleural effusion. Underlying right sided pneumonia cannot be excluded. EKG shows NSR 94, left atrial enlargement. Patient given 1L NS bolus x2. Patient started on IV Zithromax, IV Ceftriaxone, Tamiflu.    Patient was admitted to telemetry for syncope work up. He was evaluated by cardiologist, ECHO done, syncope likely orthostatic/vasogal episode. Patient was found to have Influenza started on tamiflu. He was followed by GI for his cirrhosis, which has been stable for many years as per patient who follows at [facility]. HepC viral PCR was checked noted to be non reactive. On CT chest patient noted to have a loculated R pleural effusion. He was evaluated by pulmonologist and CT surgery. As per CT surgery patient not a candidate for surgical intervention. Patient underwent thoracentesis of pleural effusion. Appears to be exudative effusion, gram stain negative, cytopathology negative for malgnant cells, tissue culture showing... 62 year old M with PMHx of esophageal varices, cirrhosis, Hepatitis C (treated in the past with Interferon, Ribavirin, neupogen, victrellis), hx of pleural effusions (Last 2012) complaining of syncope. He is complaining of fever at home ( Tmax 101.5), cough, congestion, URI, right sided chest pain, myalgias and malaise that has been worsening over the last 4 days. He denies sick contacts at home. He has been taking tylenol for the fever. He states he was in shower yesterday and he suddenly passed out. He denies head trauma, neck pain, numbness/tingling but admits to hitting his back on the tub. He doesn't remember the incident but his wife was able to help him back up. He admits to "passing out" in the past, about 6 times when he used to take Interferon for Hepatitis C. He believes that this time he must have passed out because his Blood pressure was too low while taking his Nadolol. He states he has not been eating or drinking much.     In the ED, the patient's vital signs were T: 99, HR 98, /75, R 16, SpO2 96% RA. WBC 12.1. AST slightly elevated to 40. Bilirubin 5.9. Lactate 2.8 Cardiac enzymes negative x1. Patient is positive for Influenza B. CT cervical spine: No acute fracture or traumatic subluxation. Air-fluid levels within the bilateral maxillary sinuses. Correlate clinically for acute sinusitis. Right pleural thickening, partially imaged. CT chest: Loculated medium sized right pleural effusion with pleural thickening. There is mild diffuse pleural enhancement. An empyema or neoplastic process is not excluded. Nonspecific 2.3 x 2.0 cm left hilar adenopathy. Cirrhosis with associated manifestations as detailed above. CXR: Moderate right pleural effusion. Underlying right sided pneumonia cannot be excluded. EKG shows NSR 94, left atrial enlargement. Patient given 1L NS bolus x2. Patient started on IV Zithromax, IV Ceftriaxone, Tamiflu.    Patient was admitted to telemetry for syncope work up. He was evaluated by cardiologist, ECHO done, syncope likely orthostatic/vasogal episode. Patient was found to have Influenza started on tamiflu. He was followed by GI for his cirrhosis, which has been stable for many years as per patient who follows at [facility]. HepC viral PCR was checked noted to be non reactive. On CT chest patient noted to have a loculated R pleural effusion. He was continued on IV antibiotics for suspected pneumonia. He was evaluated by pulmonologist and CT surgery. As per CT surgery patient not a candidate for surgical intervention. Patient underwent thoracentesis of pleural effusion. Appears to be exudative effusion, gram stain negative, cytopathology negative for malgnant cells, tissue culture showing... 62 year old M with PMHx of esophageal varices, cirrhosis, Hepatitis C (treated in the past with Interferon, Ribavirin, neupogen, victrellis), hx of pleural effusions (Last 2012) complaining of syncope. He is complaining of fever at home ( Tmax 101.5), cough, congestion, URI, right sided chest pain, myalgias and malaise that has been worsening over the last 4 days. He denies sick contacts at home. He has been taking tylenol for the fever. He states he was in shower yesterday and he suddenly passed out. He denies head trauma, neck pain, numbness/tingling but admits to hitting his back on the tub. He doesn't remember the incident but his wife was able to help him back up. He admits to "passing out" in the past, about 6 times when he used to take Interferon for Hepatitis C. He believes that this time he must have passed out because his Blood pressure was too low while taking his Nadolol. He states he has not been eating or drinking much.     In the ED, the patient's vital signs were T: 99, HR 98, /75, R 16, SpO2 96% RA. WBC 12.1. AST slightly elevated to 40. Bilirubin 5.9. Lactate 2.8 Cardiac enzymes negative x1. Patient is positive for Influenza B. CT cervical spine: No acute fracture or traumatic subluxation. Air-fluid levels within the bilateral maxillary sinuses. Correlate clinically for acute sinusitis. Right pleural thickening, partially imaged. CT chest: Loculated medium sized right pleural effusion with pleural thickening. There is mild diffuse pleural enhancement. An empyema or neoplastic process is not excluded. Nonspecific 2.3 x 2.0 cm left hilar adenopathy. Cirrhosis with associated manifestations as detailed above. CXR: Moderate right pleural effusion. Underlying right sided pneumonia cannot be excluded. EKG shows NSR 94, left atrial enlargement. Patient given 1L NS bolus x2. Patient started on IV Zithromax, IV Ceftriaxone, Tamiflu.    Patient was admitted to telemetry for syncope work up. He was evaluated by cardiologist, ECHO done, syncope likely orthostatic/vasogal episode. Patient was found to have Influenza started on tamiflu. He was followed by GI for his cirrhosis, which has been stable for many years as per patient who follows at [facility]. HepC viral PCR was checked noted to be non reactive. On CT chest patient noted to have a loculated R pleural effusion. He was continued on IV antibiotics for suspected pneumonia. He was evaluated by pulmonologist and CT surgery. As per CT surgery patient not a candidate for surgical intervention. Patient underwent thoracentesis of pleural effusion. Appears to be exudative effusion, gram stain negative, cytopathology negative for malignant cells, tissue culture showing no growth. Patient seen by ID as well, continue managment for PNA with antibiotics. Patient remains hemodynamically stable for discharge with close outpatient follow up as scheduled.    Vital Signs Last 24 Hrs  T(C): 36.7 (01 Mar 2018 09:34), Max: 37.8 (01 Mar 2018 05:07)  T(F): 98.1 (01 Mar 2018 09:34), Max: 100.1 (01 Mar 2018 05:07)  HR: 90 (01 Mar 2018 09:34) (90 - 107)  BP: 118/70 (01 Mar 2018 09:34) (106/66 - 133/82)  BP(mean): --  RR: 16 (01 Mar 2018 09:34) (16 - 18)  SpO2: 95% (01 Mar 2018 09:34) (94% - 96%)    General: NAD  Neurology: A&Ox3 , nonfocal  Respiratory: CTAB, no crackles, improved R sided BS  CV: RRR, S1S2  Abdominal: Soft, NT, ND +BS  Extremities: No edema, + peripheral pulses

## 2018-02-28 NOTE — DISCHARGE NOTE ADULT - MEDICATION SUMMARY - MEDICATIONS TO TAKE
I will START or STAY ON the medications listed below when I get home from the hospital:    ibuprofen 400 mg oral tablet  -- 1 tab(s) by mouth once a day, As needed, for pain and fever  -- Indication: For As needed for pain    nadolol 20 mg oral tablet  -- orally once a day  -- Indication: For Esophageal varices    Ceftin 250 mg oral tablet  -- 2 tab(s) by mouth every 12 hours   -- Finish all this medication unless otherwise directed by prescriber.  Medication should be taken with plenty of water.  Take with food or milk.    -- Indication: For PNA (pneumonia)    ursodiol 300 mg oral capsule  -- 1 cap(s) by mouth 2 times a day (with meals)  -- Indication: For Cirrhosis

## 2018-02-28 NOTE — DISCHARGE NOTE ADULT - VISION (WITH CORRECTIVE LENSES IF THE PATIENT USUALLY WEARS THEM):
Normal vision: sees adequately in most situations; can see medication labels, newsprint/reading and distance glasses not with pt

## 2018-02-28 NOTE — PROGRESS NOTE ADULT - PROBLEM SELECTOR PLAN 3
on IV abx  + influenza on Tamiflu  management as per pulm
on IV abx  + influenza on Tamiflu  management as per pulm  s/p thoracentesis f/u pulm recs
on IV abx  management as per pulm
syncope 2/2 orthostatic hypotension vs. dehydration.   - f/u TTE   -Cardio, Dr. El, consulted
syncope 2/2 orthostatic hypotension vs. dehydration.   -TTE done normal left and right ventricular function  -Cardio, Dr. El, consulted
syncope 2/2 orthostatic hypotension vs. dehydration.   -TTE done normal left and right ventricular function  -Cardio, Dr. El, consulted

## 2018-02-28 NOTE — DISCHARGE NOTE ADULT - PATIENT PORTAL LINK FT
You can access the [facility] Patient Portal, offered by [facility], by registering with the following website: [email or web address]

## 2018-02-28 NOTE — PROGRESS NOTE ADULT - SUBJECTIVE AND OBJECTIVE BOX
INTERVAL HPI/OVERNIGHT EVENTS: Patient seen and examined at bedside. No acute events overnight. Evaluated by CT surgery for loculated pleural lesion/effusion. Not candidate for surgical intervention. Thoracentesis by IR done  pending results.  Complains of rib pain when coughing, denies any other complaint.     MEDICATIONS  (STANDING):  ALBUTerol    0.083% 2.5 milliGRAM(s) Nebulizer every 12 hours  azithromycin  IVPB 500 milliGRAM(s) IV Intermittent every 24 hours  cefTRIAXone   IVPB 1 Gram(s) IV Intermittent every 24 hours  enoxaparin Injectable 40 milliGRAM(s) SubCutaneous daily  guaiFENesin  milliGRAM(s) Oral every 12 hours  oseltamivir 75 milliGRAM(s) Oral two times a day  phytonadione   Solution 2.5 milliGRAM(s) Oral daily  ursodiol Capsule 300 milliGRAM(s) Oral two times a day with meals    MEDICATIONS  (PRN):  ibuprofen  Tablet 400 milliGRAM(s) Oral daily PRN fever, temperature greater than 100.3  ketorolac   Injectable 15 milliGRAM(s) IV Push daily PRN Moderate Pain (4 - 6)  morphine  - Injectable 2 milliGRAM(s) IV Push every 6 hours PRN Moderate Pain (4 - 6)        Allergies    No Known Allergies    Intolerances        CONSTITUTIONAL: +myalgia/ weakness, low grade fevers, no chills  RESPIRATORY: No cough, wheezing, hemoptysis; No shortness of breath  Cvs: No chest pain or palpitations  Gi: No abdominal pain. No nausea, vomiting, diarrhea or constipation. No melena or hematochezia.  Neuro: no weakness    All other review of systems is negative unless indicated above.    Vital Signs Last 24 Hrs  T(C): 36.8 (2018 13:44), Max: 38.7 (2018 15:22)  T(F): 98.2 (2018 13:44), Max: 101.6 (2018 15:22)  HR: 102 (2018 13:44) (80 - 103)  BP: 111/66 (2018 13:44) (107/69 - 123/74)  BP(mean): --  RR: 17 (2018 13:44) (17 - 17)  SpO2: 94% (2018 13:44) (92% - 95%)    General: NAD  Neurology: A&Ox3 , nonfocal  Respiratory: reduced breath sounds R side  CV: RRR, S1S2  Abdominal: Soft, NT, ND +BS  Extremities: No edema, + peripheral pulses    LABS:                        11.3   6.0   )-----------( 78       ( 2018 07:08 )             30.8         139  |  105  |  13  ----------------------------<  103<H>  4.5   |  27  |  0.70    Ca    8.0<L>      2018 07:08    TPro  5.8<L>  /  Alb  2.2<L>  /  TBili  4.1<H>  /  DBili  1.60<H>  /  AST  32  /  ALT  23  /  AlkPhos  85                Urinalysis Basic - ( 2018 13:06 )    Color: Yellow / Appearance: Clear / S.005 / pH: x  Gluc: x / Ketone: Negative  / Bili: Negative / Urobili: 1   Blood: x / Protein: 25 mg/dL / Nitrite: Negative   Leuk Esterase: Trace / RBC: x / WBC occasional /HPF   Sq Epi: x / Non Sq Epi: x / Bacteria: x        RADIOLOGY & ADDITIONAL TESTS:    < from: CT Chest w/ Oral Cont and w/ IV Cont (18 @ 10:54) >  IMPRESSION:    Loculated medium sized right pleural effusion with pleural thickening.   There is mild diffuse pleural enhancement. An empyema or neoplastic   process is not excluded.    Nonspecific 2.3 x 2.0 cm left hilar adenopathy.    Cirrhosis with associated manifestations as detailed above.                MIGDALIA GAMBLE M.D., ATTENDING RADIOLOGIST  This document has been electronically signed. 2018 11:24AM          < end of copied text >

## 2018-03-01 VITALS
SYSTOLIC BLOOD PRESSURE: 108 MMHG | RESPIRATION RATE: 17 BRPM | HEART RATE: 93 BPM | TEMPERATURE: 98 F | DIASTOLIC BLOOD PRESSURE: 65 MMHG | OXYGEN SATURATION: 96 %

## 2018-03-01 DIAGNOSIS — J22 UNSPECIFIED ACUTE LOWER RESPIRATORY INFECTION: ICD-10-CM

## 2018-03-01 LAB
ANION GAP SERPL CALC-SCNC: 9 MMOL/L — SIGNIFICANT CHANGE UP (ref 5–17)
BUN SERPL-MCNC: 11 MG/DL — SIGNIFICANT CHANGE UP (ref 7–23)
CALCIUM SERPL-MCNC: 8 MG/DL — LOW (ref 8.5–10.1)
CHLORIDE SERPL-SCNC: 104 MMOL/L — SIGNIFICANT CHANGE UP (ref 96–108)
CO2 SERPL-SCNC: 24 MMOL/L — SIGNIFICANT CHANGE UP (ref 22–31)
CREAT SERPL-MCNC: 0.72 MG/DL — SIGNIFICANT CHANGE UP (ref 0.5–1.3)
GLUCOSE SERPL-MCNC: 103 MG/DL — HIGH (ref 70–99)
HCT VFR BLD CALC: 29.4 % — LOW (ref 39–50)
HGB BLD-MCNC: 11.4 G/DL — LOW (ref 13–17)
LDH SERPL L TO P-CCNC: 225 U/L — SIGNIFICANT CHANGE UP (ref 50–242)
MCHC RBC-ENTMCNC: 36.3 PG — HIGH (ref 27–34)
MCHC RBC-ENTMCNC: 38.7 GM/DL — HIGH (ref 32–36)
MCV RBC AUTO: 93.8 FL — SIGNIFICANT CHANGE UP (ref 80–100)
PLATELET # BLD AUTO: 92 K/UL — LOW (ref 150–400)
POTASSIUM SERPL-MCNC: 3.7 MMOL/L — SIGNIFICANT CHANGE UP (ref 3.5–5.3)
POTASSIUM SERPL-SCNC: 3.7 MMOL/L — SIGNIFICANT CHANGE UP (ref 3.5–5.3)
RBC # BLD: 3.13 M/UL — LOW (ref 4.2–5.8)
RBC # FLD: 12 % — SIGNIFICANT CHANGE UP (ref 10.3–14.5)
SODIUM SERPL-SCNC: 137 MMOL/L — SIGNIFICANT CHANGE UP (ref 135–145)
WBC # BLD: 6.1 K/UL — SIGNIFICANT CHANGE UP (ref 3.8–10.5)
WBC # FLD AUTO: 6.1 K/UL — SIGNIFICANT CHANGE UP (ref 3.8–10.5)

## 2018-03-01 PROCEDURE — 88305 TISSUE EXAM BY PATHOLOGIST: CPT

## 2018-03-01 PROCEDURE — 72125 CT NECK SPINE W/O DYE: CPT

## 2018-03-01 PROCEDURE — 99239 HOSP IP/OBS DSCHRG MGMT >30: CPT

## 2018-03-01 PROCEDURE — 85730 THROMBOPLASTIN TIME PARTIAL: CPT

## 2018-03-01 PROCEDURE — 71045 X-RAY EXAM CHEST 1 VIEW: CPT

## 2018-03-01 PROCEDURE — 87486 CHLMYD PNEUM DNA AMP PROBE: CPT

## 2018-03-01 PROCEDURE — 99221 1ST HOSP IP/OBS SF/LOW 40: CPT

## 2018-03-01 PROCEDURE — 84484 ASSAY OF TROPONIN QUANT: CPT

## 2018-03-01 PROCEDURE — 87040 BLOOD CULTURE FOR BACTERIA: CPT

## 2018-03-01 PROCEDURE — 87449 NOS EACH ORGANISM AG IA: CPT

## 2018-03-01 PROCEDURE — 80048 BASIC METABOLIC PNL TOTAL CA: CPT

## 2018-03-01 PROCEDURE — 83986 ASSAY PH BODY FLUID NOS: CPT

## 2018-03-01 PROCEDURE — 93306 TTE W/DOPPLER COMPLETE: CPT

## 2018-03-01 PROCEDURE — 82550 ASSAY OF CK (CPK): CPT

## 2018-03-01 PROCEDURE — 81001 URINALYSIS AUTO W/SCOPE: CPT

## 2018-03-01 PROCEDURE — 82042 OTHER SOURCE ALBUMIN QUAN EA: CPT

## 2018-03-01 PROCEDURE — 86140 C-REACTIVE PROTEIN: CPT

## 2018-03-01 PROCEDURE — 87102 FUNGUS ISOLATION CULTURE: CPT

## 2018-03-01 PROCEDURE — 96365 THER/PROPH/DIAG IV INF INIT: CPT

## 2018-03-01 PROCEDURE — 84157 ASSAY OF PROTEIN OTHER: CPT

## 2018-03-01 PROCEDURE — 87522 HEPATITIS C REVRS TRNSCRPJ: CPT

## 2018-03-01 PROCEDURE — 99285 EMERGENCY DEPT VISIT HI MDM: CPT | Mod: 25

## 2018-03-01 PROCEDURE — 87205 SMEAR GRAM STAIN: CPT

## 2018-03-01 PROCEDURE — 94760 N-INVAS EAR/PLS OXIMETRY 1: CPT

## 2018-03-01 PROCEDURE — 80053 COMPREHEN METABOLIC PANEL: CPT

## 2018-03-01 PROCEDURE — 88108 CYTOPATH CONCENTRATE TECH: CPT

## 2018-03-01 PROCEDURE — 84480 ASSAY TRIIODOTHYRONINE (T3): CPT

## 2018-03-01 PROCEDURE — 87899 AGENT NOS ASSAY W/OPTIC: CPT

## 2018-03-01 PROCEDURE — 83615 LACTATE (LD) (LDH) ENZYME: CPT

## 2018-03-01 PROCEDURE — 84443 ASSAY THYROID STIM HORMONE: CPT

## 2018-03-01 PROCEDURE — 71260 CT THORAX DX C+: CPT

## 2018-03-01 PROCEDURE — 82247 BILIRUBIN TOTAL: CPT

## 2018-03-01 PROCEDURE — 84145 PROCALCITONIN (PCT): CPT

## 2018-03-01 PROCEDURE — 93970 EXTREMITY STUDY: CPT

## 2018-03-01 PROCEDURE — 32555 ASPIRATE PLEURA W/ IMAGING: CPT

## 2018-03-01 PROCEDURE — 89051 BODY FLUID CELL COUNT: CPT

## 2018-03-01 PROCEDURE — 82248 BILIRUBIN DIRECT: CPT

## 2018-03-01 PROCEDURE — 87116 MYCOBACTERIA CULTURE: CPT

## 2018-03-01 PROCEDURE — 87581 M.PNEUMON DNA AMP PROBE: CPT

## 2018-03-01 PROCEDURE — 85610 PROTHROMBIN TIME: CPT

## 2018-03-01 PROCEDURE — 86738 MYCOPLASMA ANTIBODY: CPT

## 2018-03-01 PROCEDURE — 83605 ASSAY OF LACTIC ACID: CPT

## 2018-03-01 PROCEDURE — 70450 CT HEAD/BRAIN W/O DYE: CPT

## 2018-03-01 PROCEDURE — 99232 SBSQ HOSP IP/OBS MODERATE 35: CPT

## 2018-03-01 PROCEDURE — 76705 ECHO EXAM OF ABDOMEN: CPT

## 2018-03-01 PROCEDURE — 85027 COMPLETE CBC AUTOMATED: CPT

## 2018-03-01 PROCEDURE — 87633 RESP VIRUS 12-25 TARGETS: CPT

## 2018-03-01 PROCEDURE — 94640 AIRWAY INHALATION TREATMENT: CPT

## 2018-03-01 PROCEDURE — 82945 GLUCOSE OTHER FLUID: CPT

## 2018-03-01 PROCEDURE — 87086 URINE CULTURE/COLONY COUNT: CPT

## 2018-03-01 PROCEDURE — 84436 ASSAY OF TOTAL THYROXINE: CPT

## 2018-03-01 PROCEDURE — 87206 SMEAR FLUORESCENT/ACID STAI: CPT

## 2018-03-01 PROCEDURE — 87070 CULTURE OTHR SPECIMN AEROBIC: CPT

## 2018-03-01 PROCEDURE — 87015 SPECIMEN INFECT AGNT CONCNTJ: CPT

## 2018-03-01 PROCEDURE — 74177 CT ABD & PELVIS W/CONTRAST: CPT

## 2018-03-01 PROCEDURE — 93005 ELECTROCARDIOGRAM TRACING: CPT

## 2018-03-01 PROCEDURE — 87075 CULTR BACTERIA EXCEPT BLOOD: CPT

## 2018-03-01 PROCEDURE — 87798 DETECT AGENT NOS DNA AMP: CPT

## 2018-03-01 PROCEDURE — 82553 CREATINE MB FRACTION: CPT

## 2018-03-01 PROCEDURE — 83690 ASSAY OF LIPASE: CPT

## 2018-03-01 RX ORDER — CEFUROXIME AXETIL 250 MG
2 TABLET ORAL
Qty: 24 | Refills: 0 | OUTPATIENT
Start: 2018-03-01 | End: 2018-03-06

## 2018-03-01 RX ORDER — IBUPROFEN 200 MG
1 TABLET ORAL
Qty: 0 | Refills: 0 | COMMUNITY
Start: 2018-03-01

## 2018-03-01 RX ORDER — CEFUROXIME AXETIL 250 MG
1 TABLET ORAL
Qty: 12 | Refills: 0 | OUTPATIENT
Start: 2018-03-01 | End: 2018-03-06

## 2018-03-01 RX ORDER — ACETAMINOPHEN 500 MG
2 TABLET ORAL
Qty: 0 | Refills: 0 | COMMUNITY

## 2018-03-01 RX ORDER — URSODIOL 250 MG/1
1 TABLET, FILM COATED ORAL
Qty: 28 | Refills: 0 | OUTPATIENT
Start: 2018-03-01 | End: 2018-03-14

## 2018-03-01 RX ADMIN — CEFTRIAXONE 100 GRAM(S): 500 INJECTION, POWDER, FOR SOLUTION INTRAMUSCULAR; INTRAVENOUS at 10:07

## 2018-03-01 RX ADMIN — Medication 2.5 MILLIGRAM(S): at 11:43

## 2018-03-01 RX ADMIN — AZITHROMYCIN 255 MILLIGRAM(S): 500 TABLET, FILM COATED ORAL at 10:07

## 2018-03-01 RX ADMIN — URSODIOL 300 MILLIGRAM(S): 250 TABLET, FILM COATED ORAL at 08:16

## 2018-03-01 RX ADMIN — Medication 600 MILLIGRAM(S): at 07:01

## 2018-03-01 RX ADMIN — ENOXAPARIN SODIUM 40 MILLIGRAM(S): 100 INJECTION SUBCUTANEOUS at 11:43

## 2018-03-01 RX ADMIN — Medication 75 MILLIGRAM(S): at 07:01

## 2018-03-01 RX ADMIN — ALBUTEROL 2.5 MILLIGRAM(S): 90 AEROSOL, METERED ORAL at 09:04

## 2018-03-01 NOTE — CONSULT NOTE ADULT - SUBJECTIVE AND OBJECTIVE BOX
Date/Time Patient Seen:  		  Referring MD:   Data Reviewed	       Patient is a 62y old  Male who presents with a chief complaint of syncope (25 Feb 2018 11:34)      Subjective/HPI    in bed  seen and examined  wife at the BS  vs and meds reviewed  pt with the Flu and Atelectasis and Pleural eff  pt has Hep C and Cirrhosis, prev treated in [facility], known to GI service at [facility]  prev on transplant list, currently off the list as per Patient  pt has hx of thoracentesis on the right side  x 2    now with SOB - progressive for 2 months, cough, sob, quick, pt is active  in ER sat is 97 pct on room air at rest    62 year old M with PMHx of esophageal varices complaining of syncope. He is complaining of cough, congestion, URI, malaise x4 days. He states he was in shower yesterday and he suddenly passed out. He denies head trauma, neck pain, numbness/tingling but admits to hitting his back on the tub.     In the ED, the patient's vital signs were T: 99, HR 98, /75, R 16, SpO2 96% RA. WBC 12.1. AST slightly elevated to 40. Lactate 2.8 Cardiac enzymes negative x1. Patient is positive for Influenza B. CT cervical spine: No acute fracture or traumatic subluxation. Air-fluid levels within the bilateral maxillary sinuses. Correlate clinically for acute sinusitis.Right pleural thickening, partially imaged. CT chest: Loculated medium sized right pleural effusion with pleural thickening. There is mild diffuse pleural enhancement. An empyema or neoplastic process is not excluded. Nonspecific 2.3 x 2.0 cm left hilar adenopathy. Cirrhosis with associated manifestations as detailed above. CXR: Moderate right pleural effusion. Underlying right sided pneumonia cannot be excluded. Patient given 1L NS bolus x2. Patient started on IV Zithromax, IV Ceftriaxone, Tamiflu. Blood and urine cultures pending. Repeat lactate pending.      PAST MEDICAL & SURGICAL HISTORY:  Hepatitis C  Cirrhosis  Esophageal varices  History of thoracentesis  No significant past surgical history        Medication list         MEDICATIONS  (STANDING):  ALBUTerol    0.083% 2.5 milliGRAM(s) Nebulizer every 12 hours  enoxaparin Injectable 40 milliGRAM(s) SubCutaneous every 24 hours  guaiFENesin  milliGRAM(s) Oral every 12 hours    MEDICATIONS  (PRN):         Vitals log        ICU Vital Signs Last 24 Hrs  T(C): 37.2 (25 Feb 2018 08:07), Max: 37.2 (25 Feb 2018 08:07)  T(F): 99 (25 Feb 2018 08:07), Max: 99 (25 Feb 2018 08:07)  HR: 98 (25 Feb 2018 08:07) (98 - 98)  BP: 120/75 (25 Feb 2018 08:07) (120/75 - 120/75)  BP(mean): --  ABP: --  ABP(mean): --  RR: 16 (25 Feb 2018 08:07) (16 - 16)  SpO2: 96% (25 Feb 2018 08:07) (96% - 96%)           Input and Output:  I&O's Detail      Lab Data                        14.2   12.1  )-----------( 114      ( 25 Feb 2018 08:59 )             38.7     02-25    136  |  104  |  11  ----------------------------<  134<H>  4.2   |  25  |  0.88    Ca    8.5      25 Feb 2018 08:59    TPro  7.2  /  Alb  2.8<L>  /  TBili  5.9<H>  /  DBili  x   /  AST  40<H>  /  ALT  39  /  AlkPhos  105  02-25      CARDIAC MARKERS ( 25 Feb 2018 08:59 )  <.015 ng/mL / x     / 59 U/L / x     / <0.5 ng/mL    ex smoker  retired     lives in [place]      Review of Systems	  SOB  QUICK  cough      Objective     Physical Examination    head at  heart s1s2  lungs dec BS  abd soft  extr trace edema  cn grossly int  moves all extr      Pertinent Lab findings & Imaging      Matthews:  NO   Adequate UO     I&O's Detail           Discussed with:     Cultures:	        Radiology      EXAM:  CT ABDOMEN AND PELVIS OC IC                          EXAM:  CT CHEST OC IC                            PROCEDURE DATE:  02/25/2018          INTERPRETATION:  CLINICAL INFORMATION: Fever, malaise, and syncope.    COMPARISON: None.    PROCEDURE:   CT of the Chest, Abdomen and Pelvis was performed with intravenous   contrast.   Intravenous contrast: 90 ml Omnipaque 350. 10 ml discarded.  Oral contrast: positive contrast was administered.  Sagittal and coronal reformats were performed.    FINDINGS:    CHEST:     LUNGS AND LARGE AIRWAYS: Patent central airways. No sizable pulmonary   nodules.  PLEURA: Loculated medium sized right pleural effusion with pleural   thickening. There is mild diffuse pleural enhancement. An empyema is not   excluded.  VESSELS: Esophageal varices.  HEART: Heart size is normal.No pericardial effusion.  MEDIASTINUM AND FAUSTO: Nonspecific 2.3 x 2.0 cm left hilar adenopathy.  CHEST WALL AND LOWER NECK: No adenopathy. Normal-appearing thyroid gland,   partially imaged.     ABDOMEN AND PELVIS:    LIVER: Cirrhotic.  BILE DUCTS: Normal caliber.  GALLBLADDER: Cholelithiasis.  SPLEEN: Splenomegaly. A few nonspecific subcentimeter hypodensities, too   small characterize.  PANCREAS: Within normal limits.  ADRENALS: Within normal limits.  KIDNEYS/URETERS: Right renal cyst.    BLADDER: Within normal limits.  REPRODUCTIVE ORGANS: The prostate is enlarged.    BOWEL: No bowel obstruction. Colonic diverticulosis.  PERITONEUM: No ascites.  VESSELS:  Upper abdominal varices. Atherosclerotic disease of the   abdominal aorta without aneurysm.  RETROPERITONEUM: No lymphadenopathy.    ABDOMINAL WALL: Within normal limits.  BONES: Within normal limits.    IMPRESSION:    Loculated medium sized right pleural effusion with pleural thickening.   There is mild diffuse pleural enhancement. An empyema or neoplastic   process is not excluded.    Nonspecific 2.3 x 2.0 cm left hilar adenopathy.    Cirrhosis with associated manifestations as detailed above.                MIGDALIA GAMBLE M.D., ATTENDING RADIOLOGIST  This document has been electronically signed. Feb 25 2018 11:24AM
CHIEF COMPLAINT: Patient is a 62y old  Male who presents with a chief complaint of syncope (25 Feb 2018 11:34)      HPI:  62 year old M with PMHx of esophageal varices from Hep C cirrhosis presents with  syncope. He is complaining of cough, congestion, URI, malaise x4 days. He states he was in shower yesterday. she shower was hot. He then exited shower and had LOC. . He denies head trauma, neck pain, numbness/tingling but admits to hitting his back on the tub. Wife states that he looked very pale. Denies any   PND, orthopnea, stroke like symptoms. + pleuritic chest pain.     In the ED, the patient's vital signs were T: 99, HR 98, /75, R 16, SpO2 96% RA. WBC 12.1. AST slightly elevated to 40. Lactate 2.8 Cardiac enzymes negative x1. Patient is positive for Influenza B. CT cervical spine: No acute fracture or traumatic subluxation. Air-fluid levels within the bilateral maxillary sinuses. Correlate clinically for acute sinusitis. Right pleural thickening, partially imaged. CT chest: Loculated medium sized right pleural effusion with pleural thickening. There is mild diffuse pleural enhancement. An empyema or neoplastic process is not excluded. Nonspecific 2.3 x 2.0 cm left hilar adenopathy. Cirrhosis with associated manifestations as detailed above. CXR: Moderate right pleural effusion. Underlying right sided pneumonia cannot be excluded. Patient given 1L NS bolus x2. Patient started on IV Zithromax, IV Ceftriaxone, Tamiflu. Blood and urine cultures pending. Repeat lactate pending.        EKG:     REVIEW OF SYSTEMS:   All other review of systems are negative unless indicated above    PAST MEDICAL & SURGICAL HISTORY:  Hepatitis C  Cirrhosis  Esophageal varices  History of thoracentesis      SOCIAL HISTORY:  No tobacco, ethanol, or drug abuse.    FAMILY HISTORY:  No pertinent family history in first degree relatives    No family history of acute MI or sudden cardiac death.    MEDICATIONS  (STANDING):  enoxaparin Injectable 40 milliGRAM(s) SubCutaneous every 24 hours    MEDICATIONS  (PRN):      Allergies    No Known Allergies    Intolerances        Home meds:  Home Medications:        VITAL SIGNS:   Vital Signs Last 24 Hrs  T(C): 37.2 (25 Feb 2018 08:07), Max: 37.2 (25 Feb 2018 08:07)  T(F): 99 (25 Feb 2018 08:07), Max: 99 (25 Feb 2018 08:07)  HR: 98 (25 Feb 2018 08:07) (98 - 98)  BP: 120/75 (25 Feb 2018 08:07) (120/75 - 120/75)  BP(mean): --  RR: 16 (25 Feb 2018 08:07) (16 - 16)  SpO2: 96% (25 Feb 2018 08:07) (96% - 96%)    I&O's Summary      On Exam:     Constitutional: NAD, awake and alert, well-developed  HEENT: Dry Mucous Membranes, Anicteric  Pulmonary: Decreased breath sounds b/l R>L  Cardiovascular: Regular, S1 and S2, distant   Gastrointestinal: Bowel Sounds present, soft, nontender.   Lymph: trace peripheral edema. No lymphadenopathy.  Skin: No visible rashes or ulcers.  Psych:  Mood & affect appropriate    LABS: All Labs Reviewed:                        14.2   12.1  )-----------( 114      ( 25 Feb 2018 08:59 )             38.7     25 Feb 2018 08:59    136    |  104    |  11     ----------------------------<  134    4.2     |  25     |  0.88     Ca    8.5        25 Feb 2018 08:59    TPro  7.2    /  Alb  2.8    /  TBili  5.9    /  DBili  x      /  AST  40     /  ALT  39     /  AlkPhos  105    25 Feb 2018 08:59    PT/INR - ( 25 Feb 2018 08:59 )   PT: 17.9 sec;   INR: 1.63 ratio         PTT - ( 25 Feb 2018 08:59 )  PTT:38.8 sec  CARDIAC MARKERS ( 25 Feb 2018 08:59 )  <.015 ng/mL / x     / 59 U/L / x     / <0.5 ng/mL      Blood Culture:         RADIOLOGY:  < from: CT Chest w/ Oral Cont and w/ IV Cont (02.25.18 @ 10:54) >    EXAM:  CT ABDOMEN AND PELVIS OC IC                          EXAM:  CT CHEST OC IC                            PROCEDURE DATE:  02/25/2018          INTERPRETATION:  CLINICAL INFORMATION: Fever, malaise, and syncope.    COMPARISON: None.    PROCEDURE:  CT of the Chest, Abdomen and Pelvis was performed with intravenous   contrast.   Intravenous contrast: 90 ml Omnipaque 350. 10 ml discarded.  Oral contrast: positive contrast was administered.  Sagittal and coronal reformats were performed.    FINDINGS:    CHEST:     LUNGS AND LARGE AIRWAYS: Patent central airways. No sizable pulmonary   nodules.  PLEURA: Loculated medium sized right pleural effusion with pleural   thickening. There is mild diffuse pleural enhancement. An empyema is not   excluded.  VESSELS: Esophageal varices.  HEART: Heart size is normal.No pericardial effusion.  MEDIASTINUM AND FAUSTO: Nonspecific 2.3 x 2.0 cm left hilar adenopathy.  CHEST WALL AND LOWER NECK: No adenopathy. Normal-appearing thyroid gland,   partially imaged.     ABDOMEN AND PELVIS:    LIVER: Cirrhotic.  BILE DUCTS: Normal caliber.  GALLBLADDER: Cholelithiasis.  SPLEEN: Splenomegaly. A few nonspecific subcentimeter hypodensities, too   small characterize.  PANCREAS: Within normal limits.  ADRENALS: Within normal limits.  KIDNEYS/URETERS: Right renal cyst.    BLADDER: Within normal limits.  REPRODUCTIVE ORGANS: The prostate is enlarged.    BOWEL: No bowel obstruction. Colonic diverticulosis.  PERITONEUM: No ascites.  VESSELS:  Upper abdominal varices. Atherosclerotic disease of the   abdominal aorta without aneurysm.  RETROPERITONEUM: No lymphadenopathy.    ABDOMINAL WALL: Within normal limits.  BONES: Within normal limits.    IMPRESSION:    Loculated medium sized right pleural effusion with pleural thickening.   There is mild diffuse pleural enhancement. An empyema or neoplastic   process is not excluded.    Nonspecific 2.3 x 2.0 cm left hilar adenopathy.    Cirrhosis with associated manifestations as detailed above.                MIGDALIA GAMBLE M.D., ATTENDING RADIOLOGIST  This document has been electronically signed. Feb 25 2018 11:24AM                  < end of copied text >
Chief Complaint:  Patient is a 62y old  Male who presents with a chief complaint of syncope 62 year old M with PMHx of esophageal varices, cirrhosis, Hepatitis C (treated in the past with Interferon, Ribavirin, neupogen, victrellis), hx of pleural effusions (Last ) complaining of syncope. He is complaining of fever at home ( Tmax 101.5), cough, congestion, URI, right sided chest pain, myalgias and malaise that has been worsening over the last 4 days. He denies sick contacts at home. He has been taking tylenol for the fever. He states he was in shower yesterday and he suddenly passed out. He denies head trauma, neck pain, numbness/tingling but admits to hitting his back on the tub. He doesn't remember the incident but his wife was able to help him back up. He admits to "passing out" in the past, about 6 times when he used to take Interferon for Hepatitis C. He believes that this time he must have passed out because his Blood pressure was too low while taking his Nadolol. He states he has not been eating or drinking much.     In the ED, the patient's vital signs were T: 99, HR 98, /75, R 16, SpO2 96% RA. WBC 12.1. AST slightly elevated to 40. Bilirubin 5.9. Lactate 2.8 Cardiac enzymes negative x1. Patient is positive for Influenza B. CT cervical spine: No acute fracture or traumatic subluxation. Air-fluid levels within the bilateral maxillary sinuses. Correlate clinically for acute sinusitis. Right pleural thickening, partially imaged. CT chest: Loculated medium sized right pleural effusion with pleural thickening. There is mild diffuse pleural enhancement. An empyema or neoplastic process is not excluded. Nonspecific 2.3 x 2.0 cm left hilar adenopathy. Cirrhosis with associated manifestations as detailed above. CXR: Moderate right pleural effusion. Underlying right sided pneumonia cannot be excluded. EKG shows NSR 94, left atrial enlargement. Patient given 1L NS bolus x2. Patient started on IV Zithromax, IV Ceftriaxone, Tamiflu. Blood and urine cultures pending. Repeat lactate pending.   No new overnight event.  No N/V/D.  Tolerating diet.  he was treated for hep c in the past and is here for follow up here for flu    Allergies:  No Known Allergies    Medications:  ALBUTerol    0.083% 2.5 milliGRAM(s) Nebulizer every 12 hours  enoxaparin Injectable 40 milliGRAM(s) SubCutaneous every 24 hours  guaiFENesin  milliGRAM(s) Oral every 12 hours  ibuprofen  Tablet 400 milliGRAM(s) Oral daily PRN  morphine  - Injectable 2 milliGRAM(s) IV Push every 6 hours PRN  oseltamivir 75 milliGRAM(s) Oral two times a day  ursodiol Capsule 300 milliGRAM(s) Oral two times a day with meals    PMHX/PSHX:  Hepatitis C  Cirrhosis  Esophageal varices  History of thoracentesis  No significant past surgical history    Family history:  No pertinent family history in first degree relatives      Social History: ex [employer]  Social History: Retired. Ambulates independently without assistive devices.     	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   	Tobacco Usage:  (   ) never smoked   (x- quit 30 years ago  ) former smoker   (   ) current smoker  (     ) pack year  (        ) last cigarette date  	Alcohol Usage: Denies current EtOH use. Quit 30 years ago.     	(     ) Advanced Directives: (   x  ) None    (      ) DNR    (     ) DNI    (     ) Health Care Proxy:    ROS:     General:  No wt loss, fevers, chills, night sweats, fatigue,   Eyes:  Good vision, no reported pain  ENT:  No sore throat, pain, runny nose, dysphagia  CV:  No pain, palpitations, hypo/hypertension  Resp:  No dyspnea, cough, tachypnea, wheezing  GI:  No pain, No nausea, No vomiting, No diarrhea, No constipation, No weight loss, No fever, No pruritis, No rectal bleeding, No tarry stools, No dysphagia,  :  No pain, bleeding, incontinence, nocturia  Muscle:  No pain, weakness  Neuro:  No weakness, tingling, memory problems  Psych:  No fatigue, insomnia, mood problems, depression  Endocrine:  No polyuria, polydipsia, cold/heat intolerance  Heme:  No petechiae, ecchymosis, easy bruisability  Skin:  No rash, tattoos, scars, edema      PHYSICAL EXAM:   Vital Signs:  Vital Signs Last 24 Hrs  T(C): 37.1 (2018 16:06), Max: 37.2 (2018 08:07)  T(F): 98.7 (2018 16:06), Max: 99 (2018 08:07)  HR: 97 (2018 16:06) (97 - 101)  BP: 136/82 (2018 16:06) (120/75 - 144/63)  BP(mean): --  RR: 18 (2018 16:06) (16 - 18)  SpO2: 96% (2018 16:06) (95% - 96%)  Daily Height in cm: 180.34 (2018 16:06)    Daily Weight in k (2018 16:06)    GENERAL:  Appears stated age, well-groomed, well-nourished, no distress  HEENT:  NC/AT,  conjunctivae clear and pink, no thyromegaly, nodules, adenopathy, no JVD, sclera -anicteric  CHEST:  Full & symmetric excursion, no increased effort, breath sounds clear  HEART:  Regular rhythm, S1, S2, no murmur/rub/S3/S4, no abdominal bruit, no edema  ABDOMEN:  Soft, non-tender, non-distended, normoactive bowel sounds,  no masses ,no hepato-splenomegaly, no signs of chronic liver disease  EXTEREMITIES:  no cyanosis,clubbing or edema  SKIN:  No rash/erythema/ecchymoses/petechiae/wounds/abscess/warm/dry  NEURO:  Alert, oriented, no asterixis, no tremor, no encephalopathy    LABS:                        14.2   12.1  )-----------( 114      ( 2018 08:59 )             38.7         136  |  104  |  11  ----------------------------<  134<H>  4.2   |  25  |  0.88    Ca    8.5      2018 08:59    TPro  7.2  /  Alb  2.8<L>  /  TBili  5.9<H>  /  DBili  x   /  AST  40<H>  /  ALT  39  /  AlkPhos  105  02-    LIVER FUNCTIONS - ( 2018 08:59 )  Alb: 2.8 g/dL / Pro: 7.2 g/dL / ALK PHOS: 105 U/L / ALT: 39 U/L / AST: 40 U/L / GGT: x           PT/INR - ( 2018 08:59 )   PT: 17.9 sec;   INR: 1.63 ratio         PTT - ( 2018 08:59 )  PTT:38.8 sec  Urinalysis Basic - ( 2018 13:06 )    Color: Yellow / Appearance: Clear / S.005 / pH: x  Gluc: x / Ketone: Negative  / Bili: Negative / Urobili: 1   Blood: x / Protein: 25 mg/dL / Nitrite: Negative   Leuk Esterase: Trace / RBC: x / WBC occasional /HPF   Sq Epi: x / Non Sq Epi: x / Bacteria: x      Amylase Serum--      Lipase serum82       Ammonia--      Imaging:
[facility], Division of Infectious Diseases  AMADA Santoyo, JOEL Avendaño    MAXIM, ARSEN  62y, Male  [id number]    HPI--  62M with history of cirrhosis with esophageal varices, prior ascites, chronic thrombocytopenia, s/p Rx for HCV with clearance of viremia began feeling ill on 2/23 with fevers, chills, rigors, fatigue, malaise, shortness of breath and cough. Patient continued to feel unwell at home, but tried to "tough it out." He decided to take a hot shower and syncopized. 911 was called, but he declined to go to the ER. The following day he continued to feel unwell and presented to the ER on the 25th.    Here, he had W/U intitiated in the ER. He was found to have influenza B and a loculated R pleural effusion. Patient states that he has had effusions that had been drained in the distant past, but they had "gone away." At that time it sounds as if the effusions were thought to be translocated ascites related to his cirrhosis and HCV. Patient states he follows up with his GI regularly and his cirrhosis has not decompensated. He was unaware of any pleural fluid. He gets sonograms Q6months but does not recall recent prior CXR. Patient was seen by pulmonary and started on Rx for influenza as well as Rx for CAP. He had thoracocentesis performed which was exudative in nature. Bacterial cx NTD, AFB stain negative, fungal cx pending and cytology not concerning for malignancy. Patient is feeling much better and is without compliants presently. Shortness of breath and other symptoms have resolved.    PMH/PSH--  Hepatitis C  Cirrhosis  Esophageal varices  History of thoracentesis  No significant past surgical history      Allergies-- denies.       Medications--  Antibiotics: azithromycin  IVPB 500 milliGRAM(s) IV Intermittent every 24 hours  cefTRIAXone   IVPB 1 Gram(s) IV Intermittent every 24 hours  oseltamivir 75 milliGRAM(s) Oral two times a day    Immunologic:   Other: ALBUTerol    0.083%  enoxaparin Injectable  guaiFENesin ER  ibuprofen  Tablet PRN  ketorolac   Injectable PRN  morphine  - Injectable PRN  phytonadione   Solution  ursodiol Capsule      Social History--  EtOH: denies active  Tobacco: denies active  Drug Use: denies active    Family/Marital History--    No pertinent family history in first degree relatives  Remainder not relevant to clinical concern.    Travel/Environmental/Occupational History:  [employer]. Former .    Review of Systems:  A >=10-point review of systems was obtained.   Review of systems otherwise negative except as previously noted.    Physical Exam--  Vital Signs: T(F): 98.1 (03-01-18 @ 09:34), Max: 100.1 (03-01-18 @ 05:07)  HR: 90 (03-01-18 @ 09:34)  BP: 118/70 (03-01-18 @ 09:34)  RR: 16 (03-01-18 @ 09:34)  SpO2: 95% (03-01-18 @ 09:34)  Wt(kg): --  General: Nontoxic-appearing Male in no acute distress.  HEENT: AT/NC. PERRL. EOMI. Anicteric. Conjunctiva pink and moist. Oropharynx clear.   Neck: Not rigid. No sense of mass.  Nodes: None palpable.  Lungs: Diminished breath sounds right base. Otherwise clear bilaterally without rales, wheezing or rhonchi  Heart: Regular rate and rhythm. No Murmur. No rub. No gallop. No palpable thrill.  Abdomen: Bowel sounds present and normoactive. Soft. Nondistended. Nontender. No sense of mass. No organomegaly.  Back: No spinal tenderness. No costovertebral angle tenderness.   Extremities: No cyanosis or clubbing. 1+ LE edema.   Skin: Warm. Dry. Good turgor. Some ?telangiectasias around lips.  Psychiatric: Appropriate affect and mood for situation.         Laboratory & Imaging Data--  CBC                        11.4   6.1   )-----------( 92       ( 01 Mar 2018 07:15 )             29.4       Chemistries  03-01    137  |  104  |  11  ----------------------------<  103<H>  3.7   |  24  |  0.72    Ca    8.0<L>      01 Mar 2018 07:15    Cell Count, Body Fluid (02.27.18 @ 18:31)    Monocyte/Macrophage Count - Body Fluid: 3 %    Fluid Segmented Granulocytes: 86 %    Fluid Type: Pleural    Body Fluid Appearance: Cloudy    BF Lymphocytes: 10 %    Eosinophil Count - Body Fluid: 1 %    Tube Type: Lavender    Color - Body Fluid: Orange    Total Nucleated Cell Count, Body Fluid: 948 /uL    Total RBC Count: 45040 /uL  Protein Total, Fluid: 3.1:  Albumin, Fluid: 1.5:  Lactate Dehydrogenase, Fluid: 234:   Glucose, Fluid: 84:   pH, Fluid: 7.28 (02.27.18 @ 12:39)    < from: CT Chest w/ Oral Cont and w/ IV Cont (02.25.18 @ 10:54) >  EXAM:  CT ABDOMEN AND PELVIS OC IC                        EXAM:  CT CHEST OC IC                        PROCEDURE DATE:  02/25/2018    INTERPRETATION:  CLINICAL INFORMATION: Fever, malaise, and syncope.  COMPARISON: None.  PROCEDURE:  CT of the Chest, Abdomen and Pelvis was performed with intravenous   contrast.   Intravenous contrast: 90 ml Omnipaque 350. 10 ml discarded.  Oral contrast: positive contrast was administered.  Sagittal and coronal reformats were performed.    FINDINGS:  CHEST:   LUNGS AND LARGE AIRWAYS: Patent central airways. No sizable pulmonary   nodules.  PLEURA: Loculated medium sized right pleural effusion with pleural   thickening. There is mild diffuse pleural enhancement. An empyema is not   excluded.  VESSELS: Esophageal varices.  HEART: Heart size is normal.No pericardial effusion.  MEDIASTINUM AND FAUSTO: Nonspecific 2.3 x 2.0 cm left hilar adenopathy.  CHEST WALL AND LOWER NECK: No adenopathy. Normal-appearing thyroid gland,   partially imaged.     ABDOMEN AND PELVIS:  LIVER: Cirrhotic.  BILE DUCTS: Normal caliber.  GALLBLADDER: Cholelithiasis.  SPLEEN: Splenomegaly. A few nonspecific subcentimeter hypodensities, too   small characterize.  PANCREAS: Within normal limits.  ADRENALS: Within normal limits.  KIDNEYS/URETERS: Right renal cyst.  BLADDER: Within normal limits.  REPRODUCTIVE ORGANS: The prostate is enlarged.  BOWEL: No bowel obstruction. Colonic diverticulosis.  PERITONEUM: No ascites.  VESSELS:  Upper abdominal varices. Atherosclerotic disease of the   abdominal aorta without aneurysm.  RETROPERITONEUM: No lymphadenopathy.    ABDOMINAL WALL: Within normal limits.  BONES: Within normal limits.    IMPRESSION:  Loculated medium sized right pleural effusion with pleural thickening.   There is mild diffuse pleural enhancement. An empyema or neoplastic   process is not excluded.    Nonspecific 2.3 x 2.0 cm left hilar adenopathy.    Cirrhosis with associated manifestations as detailed above.  MIGDALIA MAVIS M.D., ATTENDING RADIOLOGIST  This document has been electronically signed. Feb 25 2018 11:24AM  < end of copied text >      Culture  Culture - Fungal, Body Fluid (collected 27 Feb 2018 18:20)  Source: .Body Fluid Pleural Fluid  Preliminary Report (28 Feb 2018 06:47):    Testing in progress    Culture - Body Fluid with Gram Stain (collected 27 Feb 2018 18:20)  Source: .Body Fluid Pleural Fluid  Gram Stain (27 Feb 2018 21:28):    polymorphonuclear leukocytes seen    No organisms seen    by cytocentrifuge  Preliminary Report (28 Feb 2018 17:29):    No growth to date.    Culture - Acid Fast - Body Fluid w/Smear (collected 27 Feb 2018 18:15)  Source: .Body Fluid Pleural Fluid    Culture - Nasopharyngeal (collected 26 Feb 2018 01:20)  Source: .Nasopharyngeal Nasopharyngeal  Final Report (27 Feb 2018 16:50):    No Neisseria. meningitidis isolated. "Culture was evaluated for N.    Meningitidis only."    Culture - Urine (collected 25 Feb 2018 16:40)  Source: .Urine Clean Catch (Midstream)  Final Report (26 Feb 2018 19:07):    No growth    Culture - Blood (collected 25 Feb 2018 13:52)  Source: .Blood Blood-Peripheral  Preliminary Report (26 Feb 2018 14:01):    No growth to date.    Culture - Blood (collected 25 Feb 2018 13:52)  Source: .Blood Blood-Peripheral  Preliminary Report (26 Feb 2018 14:01):    No growth to date.

## 2018-03-01 NOTE — PROGRESS NOTE ADULT - PROBLEM SELECTOR PLAN 1
check hep c pcr he was treated
poss CAP / PNA / URI - Flu  on tamiflu and ABX regimen for CAP  pleural eff - no infectious etiology  oral and skin care  diuresis - Cirrhosis  pleural eff - most likely related to Cirrhosis  am labs pending  low grade temp documented - at 100.1  increase activity  I heber  will need follow up as outpatient  will need CXR in 6 weeks
check hep c pcr he was treated
flu, pleural eff, atelectasis, cirrhosis, complex effusion, poss PNA  cont emp ABX  cont Tamiflu  I heber  keep sat > 88 pct  oral and skin care  GI eval noted  increase activity  Thoracic Cx pending  may need intervention vs VATS, will discuss with thoracis after the eval  discussed plan with wife and patient  supportive cough regimen for Flu and Pna related lower resp illness
s/p 685 cc drained, looks exudative, waiting for Micro and path  on ABX for PNA  on Tamiflu  5 days of Tamiflu and 7 days of ABX for PNA  increase activity  dc planning  overall feels better  will follow up with STACY CHAVEZ in [facility]  may need US chest in the future to assess for reaccumulation of Pleural eff   discussed above with pt and wife  I heber
will get thoracentesis today with IR, poor candidate for any operative / surgical approach because of cirrhosis history, discussed with thoracic surgery  will hold lovenox for procedure  pt got Vit K for coagulopathy  cont management for cirrhosis  cont management for poss PNA and FLU B - tamilfu and empiric ABX - cont NEBS and cough regimen, overall feels better and is afebrile overnight  cont use of I heber  increase activity as tolerated  discussed above with pt and wife and Nursing  will follow
Leukocytosis 2/2 viral URI. RVP positive for Influenza B.   - C/w IV Azithro and IV Ceftriaxone for now  -C/w Tamiflu 75mg BID x5 days  -Continue Mucinex 600mg q12 hours   - Motrin prn for fever in setting of hx of cirrhosis   -f/u blood and urine culture   -f/u legionella pneumophilae antigen, streptococcus Pneumoniae antigen urine, -naropharyngeal culture negative
Leukocytosis 2/2 viral URI. RVP positive for Influenza B. WBC 12.1   - C/w IV Azithro and IV Ceftriaxone for now  -C/w Tamiflu 75mg BID x5 days  -Continue Mucinex 600mg q12 hours   - Motrin prn for fever in setting of hx of cirrhosis   -f/u blood and urine culture   -f/u legionella pnuemophila antigen, streptococcus Pneumoniae antigen urine, naropharyngeal culture
RVP positive for Influenza B.   - C/w IV Azithro and IV Ceftriaxone for now  -C/w Tamiflu 75mg BID x5 days  -Continue Mucinex 600mg q12 hours   - Motrin prn for fever in setting of hx of cirrhosis   -F/u cultures   -naropharyngeal culture negative
check hep c pcr he was treated

## 2018-03-01 NOTE — PROGRESS NOTE ADULT - SUBJECTIVE AND OBJECTIVE BOX
[facility]    Lolis Camp, Tabitha, Gian, Ivory, Nikko, Kiley      [phone]    CHIEF COMPLAINT: Patient is a 62y old  Male who presents with a chief complaint of syncope, chills ,fever, SOB (28 Feb 2018 20:28)      Follow Up: syncope    Interim history: The patient reports no new symptoms.  Denies chest discomfort and shortness of breath.  No abdominal pain.  No new neurologic symptoms.      MEDICATIONS  (STANDING):  ALBUTerol    0.083% 2.5 milliGRAM(s) Nebulizer every 12 hours  azithromycin  IVPB 500 milliGRAM(s) IV Intermittent every 24 hours  cefTRIAXone   IVPB 1 Gram(s) IV Intermittent every 24 hours  enoxaparin Injectable 40 milliGRAM(s) SubCutaneous daily  guaiFENesin  milliGRAM(s) Oral every 12 hours  oseltamivir 75 milliGRAM(s) Oral two times a day  phytonadione   Solution 2.5 milliGRAM(s) Oral daily  ursodiol Capsule 300 milliGRAM(s) Oral two times a day with meals    MEDICATIONS  (PRN):  ibuprofen  Tablet 400 milliGRAM(s) Oral daily PRN fever, temperature greater than 100.3  ketorolac   Injectable 15 milliGRAM(s) IV Push daily PRN Moderate Pain (4 - 6)  morphine  - Injectable 2 milliGRAM(s) IV Push every 6 hours PRN Moderate Pain (4 - 6)      REVIEW OF SYSTEMS:  eye, ent, GI, , allergic, dermatologic, musculoskeletal and neurologic are negative except as described above    Vital Signs Last 24 Hrs  T(C): 36.7 (01 Mar 2018 09:34), Max: 37.8 (01 Mar 2018 05:07)  T(F): 98.1 (01 Mar 2018 09:34), Max: 100.1 (01 Mar 2018 05:07)  HR: 90 (01 Mar 2018 09:34) (90 - 107)  BP: 118/70 (01 Mar 2018 09:34) (106/66 - 133/82)  BP(mean): --  RR: 16 (01 Mar 2018 09:34) (16 - 18)  SpO2: 95% (01 Mar 2018 09:34) (94% - 96%)    I&O's Summary      Telemetry past 24h:    PHYSICAL EXAM:    Constitutional: well-nourished, well-developed, NAD   HEENT:  MMM, sclerae anicteric, conjunctivae clear, no oral cyanosis.  Pulmonary: Non-labored, breath sounds are clear bilaterally, No wheezing, rales or rhonchi  Cardiovascular: Regular, S1 and S2.  No murmur.  No rubs, gallops or clicks  Gastrointestinal: Bowel Sounds present, soft, nontender.   Lymph: No peripheral edema.   Neurological: Alert, no focal deficits  Skin: No rashes.  Psych:  Mood & affect appropriate    LABS: All Labs Reviewed:                        11.4   6.1   )-----------( 92       ( 01 Mar 2018 07:15 )             29.4                         11.3   6.0   )-----------( 78       ( 27 Feb 2018 07:08 )             30.8     01 Mar 2018 07:15    137    |  104    |  11     ----------------------------<  103    3.7     |  24     |  0.72   27 Feb 2018 07:08    139    |  105    |  13     ----------------------------<  103    4.5     |  27     |  0.70     Ca    8.0        01 Mar 2018 07:15  Ca    8.0        27 Feb 2018 07:08    TPro  5.8    /  Alb  2.2    /  TBili  4.1    /  DBili  1.60   /  AST  32     /  ALT  23     /  AlkPhos  85     27 Feb 2018 07:08          Blood Culture: Organism --  Gram Stain Blood -- Gram Stain   polymorphonuclear leukocytes seen  No organisms seen  by cytocentrifuge  Specimen Source .Body Fluid Pleural Fluid  Culture-Blood --    Organism --  Gram Stain Blood -- Gram Stain --  Specimen Source .Body Fluid Pleural Fluid  Culture-Blood --    Organism --  Gram Stain Blood -- Gram Stain --  Specimen Source .Nasopharyngeal Nasopharyngeal  Culture-Blood --    Organism --  Gram Stain Blood -- Gram Stain --  Specimen Source .Urine Clean Catch (Midstream)  Culture-Blood --    Organism --  Gram Stain Blood -- Gram Stain --  Specimen Source .Blood Blood-Peripheral  Culture-Blood --            RADIOLOGY:    EKG:    Echo:

## 2018-03-01 NOTE — PROGRESS NOTE ADULT - SUBJECTIVE AND OBJECTIVE BOX
Date/Time Patient Seen:  		  Referring MD:   Data Reviewed	       Patient is a 62y old  Male who presents with a chief complaint of syncope, chills ,fever, SOB (28 Feb 2018 20:28)  in bed  seen and examined  vs and meds reviewed  on ABX for CAP      Subjective/HPI     PAST MEDICAL & SURGICAL HISTORY:  Hepatitis C  Cirrhosis  Esophageal varices  History of thoracentesis  No significant past surgical history        Medication list         MEDICATIONS  (STANDING):  ALBUTerol    0.083% 2.5 milliGRAM(s) Nebulizer every 12 hours  azithromycin  IVPB 500 milliGRAM(s) IV Intermittent every 24 hours  cefTRIAXone   IVPB 1 Gram(s) IV Intermittent every 24 hours  enoxaparin Injectable 40 milliGRAM(s) SubCutaneous daily  guaiFENesin  milliGRAM(s) Oral every 12 hours  oseltamivir 75 milliGRAM(s) Oral two times a day  phytonadione   Solution 2.5 milliGRAM(s) Oral daily  ursodiol Capsule 300 milliGRAM(s) Oral two times a day with meals    MEDICATIONS  (PRN):  ibuprofen  Tablet 400 milliGRAM(s) Oral daily PRN fever, temperature greater than 100.3  ketorolac   Injectable 15 milliGRAM(s) IV Push daily PRN Moderate Pain (4 - 6)  morphine  - Injectable 2 milliGRAM(s) IV Push every 6 hours PRN Moderate Pain (4 - 6)         Vitals log        ICU Vital Signs Last 24 Hrs  T(C): 37.8 (01 Mar 2018 05:07), Max: 37.8 (01 Mar 2018 05:07)  T(F): 100.1 (01 Mar 2018 05:07), Max: 100.1 (01 Mar 2018 05:07)  HR: 100 (01 Mar 2018 05:07) (89 - 107)  BP: 125/70 (01 Mar 2018 05:07) (106/66 - 133/82)  BP(mean): --  ABP: --  ABP(mean): --  RR: 18 (01 Mar 2018 05:07) (16 - 18)  SpO2: 95% (01 Mar 2018 05:07) (93% - 96%)           Input and Output:  I&O's Detail    27 Feb 2018 07:01  -  28 Feb 2018 07:00  --------------------------------------------------------  IN:    IV PiggyBack: 350 mL  Total IN: 350 mL    OUT:  Total OUT: 0 mL    Total NET: 350 mL          Lab Data                        11.3   6.0   )-----------( 78       ( 27 Feb 2018 07:08 )             30.8     02-27    139  |  105  |  13  ----------------------------<  103<H>  4.5   |  27  |  0.70    Ca    8.0<L>      27 Feb 2018 07:08    TPro  5.8<L>  /  Alb  2.2<L>  /  TBili  4.1<H>  /  DBili  1.60<H>  /  AST  32  /  ALT  23  /  AlkPhos  85  02-27            Review of Systems	      Objective     Physical Examination    head at  heart s1s2  lungs dec BS  abd soft  cn grossly int      Pertinent Lab findings & Imaging      Byron:  NO   Adequate UO     I&O's Detail    27 Feb 2018 07:01  -  28 Feb 2018 07:00  --------------------------------------------------------  IN:    IV PiggyBack: 350 mL  Total IN: 350 mL    OUT:  Total OUT: 0 mL    Total NET: 350 mL               Discussed with:     Cultures:	        Radiology

## 2018-03-01 NOTE — PROGRESS NOTE ADULT - PROVIDER SPECIALTY LIST ADULT
Cardiology
Gastroenterology
Hospitalist
Pulmonology
Gastroenterology
Pulmonology
Gastroenterology

## 2018-03-01 NOTE — PROGRESS NOTE ADULT - ASSESSMENT
62 year old M with PMHx of esophageal varices from Hep C cirrhosis presents with syncope.     - His syncope was likely from dehydration and increased vagal tone.    - Pleural effusion may be from underlying infection.   - S/P thoracentesis 685mL clear orange fluid. Final images: ?pulm bleb, rt base. No definite ptx.  - Doubt CHF  - No signs of ischemia  - Echo normal LV systolic function normal right ventricular function no pericardial effusion   - Monitor for bleeding given varices. Was on nadolol at home.  - Monitor and replete electrolytes. Keep K>4.0 and Mg>2.0.   - Further cardiac workup will depend on clinical course.   - All other workup per primary team. Will followup.

## 2018-03-01 NOTE — PROGRESS NOTE ADULT - PROBLEM SELECTOR PROBLEM 1
Chronic hepatitis C without hepatic coma
Lower resp. tract infection
Chronic hepatitis C without hepatic coma
Pleural effusion
Leukocytosis
Leukocytosis
Pneumonia
Chronic hepatitis C without hepatic coma

## 2018-03-01 NOTE — CONSULT NOTE ADULT - ASSESSMENT
Exudative pleural effusion with borderline low pH  Would not expect loculations to develop in <3 days  Suspect chronic  Low suspicion for an infected pleural space at this point in time  Translocated loculated ascites in the DDx. Presently patient without ascites.  I feel that the risk:benefit ratio in this cirrhotic man with portal hypertension does not favor VATS.  I would pursue a fixed course of antibiotics with expectant management seems most appropriate here.  Discussed with patient and his wife.  "Red flags" re: recurrent infection addressed  All questions answered  Patient will need ongoing follow up w PCP &/or pulmonary re: effusion      Thank you for the courtesy of this referral.  Left message for Dr. Obrien  Discussed with Dr. Aguilera  Please recall as needed    Sean Roman MD  [phone]

## 2018-03-02 LAB
CULTURE RESULTS: SIGNIFICANT CHANGE UP
CULTURE RESULTS: SIGNIFICANT CHANGE UP
SPECIMEN SOURCE: SIGNIFICANT CHANGE UP
SPECIMEN SOURCE: SIGNIFICANT CHANGE UP

## 2018-03-04 LAB
CULTURE RESULTS: SIGNIFICANT CHANGE UP
SPECIMEN SOURCE: SIGNIFICANT CHANGE UP

## 2018-03-28 LAB
CULTURE RESULTS: SIGNIFICANT CHANGE UP
SPECIMEN SOURCE: SIGNIFICANT CHANGE UP

## 2018-04-18 LAB
CULTURE RESULTS: SIGNIFICANT CHANGE UP
SPECIMEN SOURCE: SIGNIFICANT CHANGE UP

## 2018-06-11 NOTE — PROGRESS NOTE ADULT - ASSESSMENT
Toxicology 62 year old M with PMHx of esophageal varices from Hep C cirrhosis presents with  syncope.   - His syncope is likely from dehydration and increased vagal tone.  Can d/c telemetry  - Pleural effusion may be from underlying infection.   - F/U pulm.  For thoracentesis today.  no cardiac contraindication  - Doubt HF  - No signs of ischemia  - Check echo  - Monitor for bleeding given varices  - Monitor and replete electrolytes. Keep K>4.0 and Mg>2.0.   - Further cardiac workup will depend on clinical course.   - All other workup per primary team. Will followup.

## 2019-02-01 NOTE — ED PROVIDER NOTE - NS ED MD DISPO ADMITTING SERVICE
----- Message from Seven Dolan MD sent at 1/30/2019 12:05 PM EST -----  Some elevated levels of protein in urine recommend 24 urine collection for protein   MED

## 2020-02-03 NOTE — BRIEF OPERATIVE NOTE - PROCEDURE POST
Called patients wife back she stated that she was just worried that Alireza would not tell Dr Bingham about everything going on. His wound on his leg from a cat bite is swollen from his ankle to his knee. I mentioned maybe we should get him back in to check it out but wife was not sure if patient would come back in until his next Protime check.   
Pt spouse that you return her call she was not able to come to the appointment but feels if though he was truthful about all of his issues.   
<<-----Click on this checkbox to enter Post-Op Dx

## 2020-09-18 ENCOUNTER — OUTPATIENT (OUTPATIENT)
Dept: OUTPATIENT SERVICES | Facility: HOSPITAL | Age: 65
LOS: 1 days | End: 2020-09-18
Payer: COMMERCIAL

## 2020-09-18 DIAGNOSIS — Z01.818 ENCOUNTER FOR OTHER PREPROCEDURAL EXAMINATION: ICD-10-CM

## 2020-09-18 DIAGNOSIS — S83.231A COMPLEX TEAR OF MEDIAL MENISCUS, CURRENT INJURY, RIGHT KNEE, INITIAL ENCOUNTER: ICD-10-CM

## 2020-09-18 DIAGNOSIS — Z98.890 OTHER SPECIFIED POSTPROCEDURAL STATES: Chronic | ICD-10-CM

## 2020-09-21 VITALS
RESPIRATION RATE: 16 BRPM | HEART RATE: 64 BPM | SYSTOLIC BLOOD PRESSURE: 155 MMHG | WEIGHT: 199.96 LBS | TEMPERATURE: 98 F | HEIGHT: 71 IN | OXYGEN SATURATION: 100 % | DIASTOLIC BLOOD PRESSURE: 68 MMHG

## 2020-09-21 DIAGNOSIS — Z98.890 OTHER SPECIFIED POSTPROCEDURAL STATES: Chronic | ICD-10-CM

## 2020-09-21 PROCEDURE — G0463: CPT

## 2020-09-21 NOTE — H&P PST ADULT - NSANTHOSAYNRD_GEN_A_CORE
No. IVANNA screening performed.  STOP BANG Legend: 0-2 = LOW Risk; 3-4 = INTERMEDIATE Risk; 5-8 = HIGH Risk

## 2020-09-21 NOTE — H&P PST ADULT - ASSESSMENT
66 y/o male with right knee pain  Planned surgery.- right knee arthroscopy  covid testing 9/22/20  Will obtain medical clearance  Pre op instructions provided  Instructions provided on medications to continue and to take the day morning of surgery   66 y/o male with right knee pain  Planned surgery.- right knee arthroscopy  covid testing 9/22/20  Will obtain medical clearance  Pre op instructions provided  Instructions provided on medications to continue and to take the day morning of surgery    Physical exam only done at bedside, daily meds addressed

## 2020-09-21 NOTE — H&P PST ADULT - MUSCULOSKELETAL
details… detailed exam no calf tenderness/right knee/decreased ROM due to pain/diminished strength/joint warmth

## 2020-09-21 NOTE — H&P PST ADULT - NSICDXPASTMEDICALHX_GEN_ALL_CORE_FT
PAST MEDICAL HISTORY:  H/O esophageal varices     History of cirrhosis of liver     History of hepatitis C

## 2020-09-21 NOTE — H&P PST ADULT - ATTENDING COMMENTS
The patient has a right knee medial meniscus tear. He has failed conservative management. I recommend a right knee arthroscopy with partial medial meniscectomy v. repair. The risks of the procedure, including bleeding, infection, pain, stiffness, damage to muscles / vessels / nerves, and need for additional surgery, were reviewed. The benefits, including decreased pain and improved function, were also reviewed. The patient understands the risks and benefits and wishes to proceed with surgery.

## 2020-09-22 ENCOUNTER — OUTPATIENT (OUTPATIENT)
Dept: OUTPATIENT SERVICES | Facility: HOSPITAL | Age: 65
LOS: 1 days | End: 2020-09-22
Payer: COMMERCIAL

## 2020-09-22 DIAGNOSIS — Z11.59 ENCOUNTER FOR SCREENING FOR OTHER VIRAL DISEASES: ICD-10-CM

## 2020-09-22 DIAGNOSIS — Z98.890 OTHER SPECIFIED POSTPROCEDURAL STATES: Chronic | ICD-10-CM

## 2020-09-22 LAB — SARS-COV-2 RNA SPEC QL NAA+PROBE: SIGNIFICANT CHANGE UP

## 2020-09-22 PROCEDURE — U0003: CPT

## 2020-09-23 ENCOUNTER — TRANSCRIPTION ENCOUNTER (OUTPATIENT)
Age: 65
End: 2020-09-23

## 2020-09-24 ENCOUNTER — OUTPATIENT (OUTPATIENT)
Dept: OUTPATIENT SERVICES | Facility: HOSPITAL | Age: 65
LOS: 1 days | End: 2020-09-24
Payer: COMMERCIAL

## 2020-09-24 ENCOUNTER — RESULT REVIEW (OUTPATIENT)
Age: 65
End: 2020-09-24

## 2020-09-24 VITALS
SYSTOLIC BLOOD PRESSURE: 126 MMHG | RESPIRATION RATE: 17 BRPM | DIASTOLIC BLOOD PRESSURE: 70 MMHG | HEART RATE: 72 BPM | OXYGEN SATURATION: 100 % | TEMPERATURE: 98 F

## 2020-09-24 VITALS
TEMPERATURE: 98 F | OXYGEN SATURATION: 100 % | DIASTOLIC BLOOD PRESSURE: 68 MMHG | HEIGHT: 68 IN | WEIGHT: 200.4 LBS | HEART RATE: 64 BPM | SYSTOLIC BLOOD PRESSURE: 155 MMHG | RESPIRATION RATE: 17 BRPM

## 2020-09-24 DIAGNOSIS — Z01.818 ENCOUNTER FOR OTHER PREPROCEDURAL EXAMINATION: ICD-10-CM

## 2020-09-24 DIAGNOSIS — Z98.890 OTHER SPECIFIED POSTPROCEDURAL STATES: Chronic | ICD-10-CM

## 2020-09-24 DIAGNOSIS — S83.231A COMPLEX TEAR OF MEDIAL MENISCUS, CURRENT INJURY, RIGHT KNEE, INITIAL ENCOUNTER: ICD-10-CM

## 2020-09-24 PROBLEM — B19.20 UNSPECIFIED VIRAL HEPATITIS C WITHOUT HEPATIC COMA: Chronic | Status: ACTIVE | Noted: 2018-02-25

## 2020-09-24 PROBLEM — I85.00 ESOPHAGEAL VARICES WITHOUT BLEEDING: Chronic | Status: ACTIVE | Noted: 2018-02-25

## 2020-09-24 PROBLEM — K74.60 UNSPECIFIED CIRRHOSIS OF LIVER: Chronic | Status: ACTIVE | Noted: 2018-02-25

## 2020-09-24 PROCEDURE — 88304 TISSUE EXAM BY PATHOLOGIST: CPT | Mod: 26

## 2020-09-24 PROCEDURE — 82962 GLUCOSE BLOOD TEST: CPT

## 2020-09-24 PROCEDURE — 88304 TISSUE EXAM BY PATHOLOGIST: CPT

## 2020-09-24 PROCEDURE — 29881 ARTHRS KNE SRG MNISECTMY M/L: CPT | Mod: RT

## 2020-09-24 PROCEDURE — 97161 PT EVAL LOW COMPLEX 20 MIN: CPT

## 2020-09-24 RX ORDER — SODIUM CHLORIDE 9 MG/ML
1000 INJECTION, SOLUTION INTRAVENOUS
Refills: 0 | Status: DISCONTINUED | OUTPATIENT
Start: 2020-09-24 | End: 2020-09-24

## 2020-09-24 RX ORDER — OXYCODONE HYDROCHLORIDE 5 MG/1
5 TABLET ORAL ONCE
Refills: 0 | Status: DISCONTINUED | OUTPATIENT
Start: 2020-09-24 | End: 2020-09-24

## 2020-09-24 RX ORDER — NADOLOL 80 MG/1
1 TABLET ORAL
Qty: 0 | Refills: 0 | DISCHARGE

## 2020-09-24 RX ORDER — CHLORHEXIDINE GLUCONATE 213 G/1000ML
1 SOLUTION TOPICAL ONCE
Refills: 0 | Status: COMPLETED | OUTPATIENT
Start: 2020-09-24 | End: 2020-09-24

## 2020-09-24 RX ORDER — ONDANSETRON 8 MG/1
4 TABLET, FILM COATED ORAL ONCE
Refills: 0 | Status: DISCONTINUED | OUTPATIENT
Start: 2020-09-24 | End: 2020-09-24

## 2020-09-24 RX ORDER — CEFAZOLIN SODIUM 1 G
2000 VIAL (EA) INJECTION ONCE
Refills: 0 | Status: COMPLETED | OUTPATIENT
Start: 2020-09-24 | End: 2020-09-24

## 2020-09-24 RX ORDER — APREPITANT 80 MG/1
40 CAPSULE ORAL ONCE
Refills: 0 | Status: COMPLETED | OUTPATIENT
Start: 2020-09-24 | End: 2020-09-24

## 2020-09-24 RX ORDER — HYDROMORPHONE HYDROCHLORIDE 2 MG/ML
0.5 INJECTION INTRAMUSCULAR; INTRAVENOUS; SUBCUTANEOUS
Refills: 0 | Status: DISCONTINUED | OUTPATIENT
Start: 2020-09-24 | End: 2020-09-24

## 2020-09-24 RX ADMIN — APREPITANT 40 MILLIGRAM(S): 80 CAPSULE ORAL at 10:10

## 2020-09-24 RX ADMIN — CHLORHEXIDINE GLUCONATE 1 APPLICATION(S): 213 SOLUTION TOPICAL at 10:10

## 2020-09-24 RX ADMIN — SODIUM CHLORIDE 75 MILLILITER(S): 9 INJECTION, SOLUTION INTRAVENOUS at 13:33

## 2020-09-24 NOTE — ASU DISCHARGE PLAN (ADULT/PEDIATRIC) - BATHING
Shower only/cover with waterproof sleeve cover with waterproof sleeve/Shower only/Do not submerge in water

## 2020-09-24 NOTE — ASU DISCHARGE PLAN (ADULT/PEDIATRIC) - CARE PROVIDER_API CALL
Jorge Alberto Tran  ORTHOPAEDIC SURGERY  [address] [place] [address]  Phone: [phone]  Fax: [phone]  Follow Up Time:

## 2020-09-24 NOTE — ASU DISCHARGE PLAN (ADULT/PEDIATRIC) - CALL YOUR DOCTOR IF YOU HAVE ANY OF THE FOLLOWING:
Fever greater than (need to indicate Fahrenheit or Celsius) Pain not relieved by Medications/Fever greater than (need to indicate Fahrenheit or Celsius)/Nausea and vomiting that does not stop/Numbness, tingling, color or temperature change to extremity/Swelling that gets worse/Bleeding that does not stop/Increased irritability or sluggishness/Inability to tolerate liquids or foods/Wound/Surgical Site with redness, or foul smelling discharge or pus

## 2020-11-16 NOTE — H&P ADULT - PROBLEM/PLAN-3
DISPLAY PLAN FREE TEXT

## 2021-08-04 NOTE — ED ADULT NURSE NOTE - PMH
Esophageal varices V-Y Plasty Text: The defect edges were debeveled with a #15 scalpel blade.  Given the location of the defect, shape of the defect and the proximity to free margins an V-Y advancement flap was deemed most appropriate.  Using a sterile surgical marker, an appropriate advancement flap was drawn incorporating the defect and placing the expected incisions within the relaxed skin tension lines where possible.    The area thus outlined was incised deep to adipose tissue with a #15 scalpel blade.  The skin margins were undermined to an appropriate distance in all directions utilizing iris scissors.

## 2022-09-29 NOTE — ASU PATIENT PROFILE, ADULT - NS PRO PASSIVE SMOKE EXP
No
The Caprini score indicates that this patient is at high risk for a VTE event (score 6 or greater). Surgical patients in this group will benefit from both pharmacologic prophylaxis and intermittent compression devices.  The surgical team will determine the balance between VTE risk and bleeding risk, and other clinical considerations

## 2022-10-03 NOTE — PATIENT PROFILE ADULT. - SLEEP, RESTED UPON AWAKENING, PROFILE
What Type Of Note Output Would You Prefer (Optional)?: Bullet Format How Severe Is Your Skin Lesion?: mild Has Your Skin Lesion Been Treated?: not been treated Is This A New Presentation, Or A Follow-Up?: Skin Lesions yes

## 2022-12-06 ENCOUNTER — RX ONLY (RX ONLY)
Age: 67
End: 2022-12-06

## 2022-12-06 ENCOUNTER — OFFICE (OUTPATIENT)
Dept: URBAN - METROPOLITAN AREA CLINIC 109 | Facility: CLINIC | Age: 67
Setting detail: OPHTHALMOLOGY
End: 2022-12-06
Payer: COMMERCIAL

## 2022-12-06 DIAGNOSIS — H11.002: ICD-10-CM

## 2022-12-06 DIAGNOSIS — H02.832: ICD-10-CM

## 2022-12-06 DIAGNOSIS — H02.835: ICD-10-CM

## 2022-12-06 DIAGNOSIS — H52.4: ICD-10-CM

## 2022-12-06 DIAGNOSIS — H02.834: ICD-10-CM

## 2022-12-06 DIAGNOSIS — H40.033: ICD-10-CM

## 2022-12-06 DIAGNOSIS — H25.13: ICD-10-CM

## 2022-12-06 DIAGNOSIS — H02.831: ICD-10-CM

## 2022-12-06 DIAGNOSIS — H35.363: ICD-10-CM

## 2022-12-06 PROCEDURE — 92014 COMPRE OPH EXAM EST PT 1/>: CPT | Performed by: OPHTHALMOLOGY

## 2022-12-06 PROCEDURE — 92015 DETERMINE REFRACTIVE STATE: CPT | Performed by: OPHTHALMOLOGY

## 2022-12-06 ASSESSMENT — REFRACTION_MANIFEST
OD_VA1: 20/20
OD_SPHERE: PL
OS_ADD: +2.75
OS_VA1: 20/20
OS_SPHERE: PL
OD_SPHERE: -0.50
OS_ADD: +2.75
OD_ADD: +2.75
OS_CYLINDER: -1.25
OS_SPHERE: -0.50-
OS_CYLINDER: -1.75
OS_AXIS: 80
OS_AXIS: 75
OD_VA1: 20/25+
OS_VA1: 20/30+
OD_ADD: +2.75

## 2022-12-06 ASSESSMENT — REFRACTION_CURRENTRX
OS_OVR_VA: 20/
OD_OVR_VA: 20/
OS_AXIS: 76
OS_CYLINDER: -1.00
OS_SPHERE: -0.50
OS_ADD: +2.00
OD_VPRISM_DIRECTION: PROGS
OD_ADD: +2.00
OD_SPHERE: -0.25
OS_VPRISM_DIRECTION: PROGS

## 2022-12-06 ASSESSMENT — CONFRONTATIONAL VISUAL FIELD TEST (CVF)
OS_FINDINGS: FULL
OD_FINDINGS: FULL

## 2022-12-06 ASSESSMENT — CORNEAL PTERYGIUM: OS_PTERYGIUM: NASAL

## 2022-12-06 ASSESSMENT — REFRACTION_AUTOREFRACTION
OD_AXIS: 109
OS_CYLINDER: -1.00
OD_CYLINDER: -0.50
OS_SPHERE: +0.25
OS_AXIS: 79
OD_SPHERE: -0.25

## 2022-12-06 ASSESSMENT — TONOMETRY
OD_IOP_MMHG: 11
OS_IOP_MMHG: 11

## 2022-12-06 ASSESSMENT — SPHEQUIV_DERIVED
OS_SPHEQUIV: -0.25
OD_SPHEQUIV: -0.5

## 2022-12-06 ASSESSMENT — VISUAL ACUITY
OS_BCVA: 20/25-2
OD_BCVA: 20/25-1

## 2023-04-11 ENCOUNTER — OFFICE (OUTPATIENT)
Dept: URBAN - METROPOLITAN AREA CLINIC 109 | Facility: CLINIC | Age: 68
Setting detail: OPHTHALMOLOGY
End: 2023-04-11
Payer: COMMERCIAL

## 2023-04-11 DIAGNOSIS — H02.835: ICD-10-CM

## 2023-04-11 DIAGNOSIS — H02.834: ICD-10-CM

## 2023-04-11 DIAGNOSIS — H35.363: ICD-10-CM

## 2023-04-11 DIAGNOSIS — H40.033: ICD-10-CM

## 2023-04-11 DIAGNOSIS — H02.832: ICD-10-CM

## 2023-04-11 DIAGNOSIS — H02.831: ICD-10-CM

## 2023-04-11 DIAGNOSIS — H11.002: ICD-10-CM

## 2023-04-11 DIAGNOSIS — H25.13: ICD-10-CM

## 2023-04-11 PROCEDURE — 92020 GONIOSCOPY: CPT | Performed by: OPHTHALMOLOGY

## 2023-04-11 PROCEDURE — 99213 OFFICE O/P EST LOW 20 MIN: CPT | Performed by: OPHTHALMOLOGY

## 2023-04-11 ASSESSMENT — SPHEQUIV_DERIVED
OS_SPHEQUIV: -0.25
OD_SPHEQUIV: -0.5

## 2023-04-11 ASSESSMENT — REFRACTION_MANIFEST
OS_ADD: +2.75
OD_SPHERE: -0.50
OD_VA1: 20/25+
OS_SPHERE: -0.50-
OS_AXIS: 75
OD_ADD: +2.75
OS_VA1: 20/20
OD_SPHERE: PL
OS_CYLINDER: -1.25
OD_VA1: 20/20
OS_AXIS: 80
OD_ADD: +2.75
OS_ADD: +2.75
OS_SPHERE: PL
OS_VA1: 20/30+
OS_CYLINDER: -1.75

## 2023-04-11 ASSESSMENT — REFRACTION_CURRENTRX
OD_SPHERE: -0.25
OS_OVR_VA: 20/
OS_CYLINDER: -1.00
OD_OVR_VA: 20/
OD_ADD: +2.00
OS_ADD: +2.00
OS_SPHERE: -0.50
OD_VPRISM_DIRECTION: PROGS
OS_AXIS: 76
OS_VPRISM_DIRECTION: PROGS

## 2023-04-11 ASSESSMENT — REFRACTION_AUTOREFRACTION
OS_AXIS: 79
OD_CYLINDER: -0.50
OD_SPHERE: -0.25
OS_SPHERE: +0.25
OS_CYLINDER: -1.00
OD_AXIS: 109

## 2023-04-11 ASSESSMENT — CORNEAL PTERYGIUM: OS_PTERYGIUM: NASAL

## 2023-04-11 ASSESSMENT — TONOMETRY
OD_IOP_MMHG: 10
OS_IOP_MMHG: 10

## 2023-04-11 ASSESSMENT — VISUAL ACUITY
OS_BCVA: 20/20-2
OD_BCVA: 20/25-1

## 2023-04-11 ASSESSMENT — CONFRONTATIONAL VISUAL FIELD TEST (CVF)
OD_FINDINGS: FULL
OS_FINDINGS: FULL

## 2023-06-02 NOTE — ASU PATIENT PROFILE, ADULT - HEALTHCARE QUESTIONS, PROFILE
62 y/o M with PMH of CVA( 10 years ago without any residual), HTN, HLD, DM type II, Chronic everyday smoker, NICM(with EF of 32%), taking Eliquis for unknown reason.   PResents with CALDWELL. Initial enzymes are unremarkable.  [facility] for [facility] for abnormal stress test in March earlier this year, nonobstructive CAD    Impression:  Dyspnea on exertion, no chest pain  CAD, moderate non-obstructive, recently diagnosed on [facility]   NiCm reported (EF 32%)    Plan:  - trend hstrop x2, EKG for chest pain PRN  - TTE formally, none in the system  - No indication for eliquis, obtain collateral to see indication. Patient denies history of DVT/PE, no history of recorded afib and currently in NSR  - Aspirin, atorva 80  - Continue metoprolol, valsartan (prior CAD, diabetes)  62 y/o M with PMH of CVA( 10 years ago without any residual), HTN, HLD, DM type II, Chronic everyday smoker, NICM(with EF of 32%), taking Eliquis for unknown reason.   PResents with CALDWELL. Initial enzymes are unremarkable.  [facility] for [facility] for abnormal stress test in March earlier this year, nonobstructive CAD    Impression:  Dyspnea on exertion, no chest pain  CAD, moderate non-obstructive, recently diagnosed on [facility]   NiCm reported (EF 32%)    Plan:  - trend hstrop x2, EKG for chest pain PRN  - TTE formally, none in the system  - No indication for eliquis, obtain collateral to see indication. Patient denies history of DVT/PE, no history of recorded afib and currently in NSR. Monitor on tele  - Aspirin, atorva 80  - Continue metoprolol, valsartan (prior CAD, diabetes)  64 y/o M with PMH of CVA( 10 years ago without any residual), HTN, HLD, DM type II, Chronic everyday smoker, NICM (with EF of 32%), taking Eliquis for unknown reason.   Presents with CALDWELL. Initial enzymes are unremarkable.  [facility] for [facility] for abnormal stress test in March earlier this year, nonobstructive CAD    Impression:  Dyspnea on exertion, no chest pain  CAD, moderate non-obstructive, recently diagnosed on [facility]   NiCm reported (EF 32%)    Plan:  - trend hstrop x2, EKG for chest pain PRN  - TTE formally, none in the system  - No indication for eliquis, obtain collateral to see indication. Patient denies history of DVT/PE, no history of recorded afib and currently in NSR. Monitor on tele  - Aspirin, atorva 80  - Continue metoprolol, valsartan (prior CAD, diabetes)  none

## 2023-07-14 NOTE — PATIENT PROFILE ADULT. - SELF-CARE: ACTIVITY TOLERANCE, USUAL, PROFILE
See primary care provider for recheck on Tuesday  Copy of labs to follow-up  Hydrate well.  Cold fluids may be better than warm fluids  Soft foods  Ibuprofen as needed for fever and pain  Prednisone as prescribed  Pepcid while on ibuprofen and prednisone  Avoid acetaminophen products for the time being due to elevated liver enzymes from mononucleosis  Avoid any activities with the potential striking abdomen  Return for difficulty breathing, swallowing, abdominal pain, yellowing of eyes or skin, worsening of symptoms, new symptoms or changes or concerns  
good

## 2023-07-26 NOTE — PROGRESS NOTE ADULT - PROBLEM SELECTOR PLAN 6
The medication list included in this document is our record of what you are currently taking, including any changes that were made at today's visit. If you find any differences when compared to your medications at home, or have any questions that were not answered at your visit, please contact the office. We are committed to providing you with the best care possible. In order to help us achieve these goals please remember to bring all medications, herbal products, and over the counter supplements with you to each visit. If your provider has ordered testing for you, please be sure to follow up with our office if you have not received results within 7 days after the testing took place. *If you receive a survey after visiting one of our offices, please take time to share your experience concerning your physician office visit. These surveys are confidential and no health information about you is shared. We are eager to improve for you and we are counting on your feedback to help make that happen. Keep a list of your medicines with you. List all of the prescription medicines, nonprescription medicines, supplements, natural remedies, and vitamins that you take. Tell your healthcare providers who treat you about all of the products you are taking. Your provider can provide you with a form to keep track of them. Just ask. Follow the directions that come with your medicine, including information about food or alcohol. Make sure you know how and when to take your medicine. Do not take more or less than you are supposed to take. Keep all medicines out of the reach of children. Store medicines according to the directions on the label. Monitor yourself. Learn to know how your body reacts to your new medicine and keep track of how it makes you feel before attempting (If your provider has allowed you to do so) to drive or go to work.    Seek emergency medical attention if you think you have used too much of this Esophageal varices, chronic.   - patient refusing nadolol to bring in home nadolol, monitor for now

## 2023-08-17 NOTE — PROGRESS NOTE ADULT - PROBLEM SELECTOR PLAN 6
Esophageal varices, chronic.   - patient refusing nadolol to bring in home nadolol, monitor for now [employer] Counseling:  I discussed with the patient the risks of mycophenolate mofetil including but not limited to infection/immunosuppression, GI upset, hypokalemia, hypercholesterolemia, bone marrow suppression, lymphoproliferative disorders, malignancy, GI ulceration/bleed/perforation, colitis, interstitial lung disease, kidney failure, progressive multifocal leukoencephalopathy, and birth defects.  The patient understands that monitoring is required including a baseline creatinine and regular CBC testing. In addition, patient must alert us immediately if symptoms of infection or other concerning signs are noted.

## 2023-09-20 ENCOUNTER — OFFICE (OUTPATIENT)
Dept: URBAN - METROPOLITAN AREA CLINIC 109 | Facility: CLINIC | Age: 68
Setting detail: OPHTHALMOLOGY
End: 2023-09-20
Payer: COMMERCIAL

## 2023-09-20 DIAGNOSIS — H02.835: ICD-10-CM

## 2023-09-20 DIAGNOSIS — H02.832: ICD-10-CM

## 2023-09-20 DIAGNOSIS — H40.033: ICD-10-CM

## 2023-09-20 DIAGNOSIS — H02.834: ICD-10-CM

## 2023-09-20 DIAGNOSIS — H35.363: ICD-10-CM

## 2023-09-20 DIAGNOSIS — H25.13: ICD-10-CM

## 2023-09-20 DIAGNOSIS — H11.002: ICD-10-CM

## 2023-09-20 DIAGNOSIS — H02.831: ICD-10-CM

## 2023-09-20 PROCEDURE — 99213 OFFICE O/P EST LOW 20 MIN: CPT | Performed by: OPHTHALMOLOGY

## 2023-09-20 ASSESSMENT — REFRACTION_MANIFEST
OD_VA1: 20/20-
OD_SPHERE: -0.50
OS_VA1: 20/25
OS_VA1: 20/30+
OS_CYLINDER: -1.25
OD_SPHERE: PL
OS_SPHERE: PL
OS_ADD: +2.75
OS_ADD: +2.75
OS_AXIS: 80
OS_AXIS: 75
OD_ADD: +2.75
OD_ADD: +2.75
OS_CYLINDER: -1.75
OD_VA1: 20/25+
OS_SPHERE: -0.50-

## 2023-09-20 ASSESSMENT — REFRACTION_CURRENTRX
OS_ADD: +2.00
OD_ADD: +2.00
OS_VPRISM_DIRECTION: PROGS
OD_SPHERE: -0.25
OS_AXIS: 76
OS_OVR_VA: 20/
OS_SPHERE: -0.50
OD_VPRISM_DIRECTION: PROGS
OD_OVR_VA: 20/
OS_CYLINDER: -1.00

## 2023-09-20 ASSESSMENT — REFRACTION_AUTOREFRACTION
OS_SPHERE: +0.75
OS_CYLINDER: -2.25
OS_AXIS: 79
OD_AXIS: 112
OD_CYLINDER: -1.00
OD_SPHERE: +0.75

## 2023-09-20 ASSESSMENT — SPHEQUIV_DERIVED
OS_SPHEQUIV: -0.375
OD_SPHEQUIV: 0.25

## 2023-09-20 ASSESSMENT — CONFRONTATIONAL VISUAL FIELD TEST (CVF)
OS_FINDINGS: FULL
OD_FINDINGS: FULL

## 2023-09-20 ASSESSMENT — VISUAL ACUITY
OD_BCVA: 20/30-2
OS_BCVA: 20/25-2

## 2023-09-20 ASSESSMENT — CORNEAL PTERYGIUM: OS_PTERYGIUM: NASAL

## 2023-12-11 NOTE — ASU PATIENT PROFILE, ADULT - PATIENT'S HEIGHT AND WEIGHT RECORDED IN THE VITAL SIGNS FLOWSHEET
yes 12/07, 100.4 kg, 12/08, 101.7 kg(drug dose wt.)  12/11, 104.5 kg(daily wt.), wt. gain/edema noted since adm

## 2024-02-06 PROBLEM — Z86.19 PERSONAL HISTORY OF OTHER INFECTIOUS AND PARASITIC DISEASES: Chronic | Status: ACTIVE | Noted: 2020-09-21

## 2024-02-06 PROBLEM — Z87.19 PERSONAL HISTORY OF OTHER DISEASES OF THE DIGESTIVE SYSTEM: Chronic | Status: ACTIVE | Noted: 2020-09-21

## 2024-02-07 ENCOUNTER — APPOINTMENT (OUTPATIENT)
Dept: ORTHOPEDIC SURGERY | Facility: CLINIC | Age: 69
End: 2024-02-07
Payer: COMMERCIAL

## 2024-02-07 VITALS — WEIGHT: 190 LBS | HEIGHT: 71 IN | BODY MASS INDEX: 26.6 KG/M2

## 2024-02-07 DIAGNOSIS — M23.92 UNSPECIFIED INTERNAL DERANGEMENT OF LEFT KNEE: ICD-10-CM

## 2024-02-07 DIAGNOSIS — Z87.19 PERSONAL HISTORY OF OTHER DISEASES OF THE DIGESTIVE SYSTEM: ICD-10-CM

## 2024-02-07 PROCEDURE — 99203 OFFICE O/P NEW LOW 30 MIN: CPT

## 2024-02-07 PROCEDURE — 73564 X-RAY EXAM KNEE 4 OR MORE: CPT | Mod: LT

## 2024-02-07 RX ORDER — NADOLOL 20 MG/1
20 TABLET ORAL
Refills: 0 | Status: ACTIVE | COMMUNITY

## 2024-02-07 NOTE — HISTORY OF PRESENT ILLNESS
[7] : 7 [6] : 6 [Dull/Aching] : dull/aching [Ice] : ice [de-identified] : 2/7/24: Here for left knee pain x 1 month, worsening over the last week. Has tried tylenol with some relief. No prior left knee injury.   PMHX: Liver disease, HTN  [] : no [FreeTextEntry1] : L KNEE [FreeTextEntry3] : 2/4/24 [FreeTextEntry5] : no injury hurts to walk hears clicking [FreeTextEntry9] : rest [de-identified] : activity

## 2024-02-07 NOTE — ASSESSMENT
[FreeTextEntry1] : TRAUMATIC LEFT KNEE PAIN S/P TWISTING INJURY  MINIMAL OA ON XR  +MEDIAL JLT AND LINDA ON EXAM TODAY WILL OBTAIN LEFT KNEE MRI TO EVAL FOR MENSICAL INJURY  CRYOTHERAPY, NSAIDS, ACTIVITY MODIFICATION  RTC AFTER MRI TO REVIEW RESULTS

## 2024-02-07 NOTE — IMAGING
[de-identified] : Left Knee Mild effusion, no warmth, no ecchymosis Medial joint line tenderness to palpation Range of motion 0-130 5/5 quadriceps and hamstring strength Positive [facility] No varus or valgus instability, negative lachman Motor and sensory intact distally Mildly antalgic gait [Left] : left knee [All Views] : anteroposterior, lateral, skyline, and anteroposterior standing [Mild tricompartmental OA medial narrowing] : Mild tricompartmental OA medial narrowing [Mild tricompartmental OA lateral narrowing] : Mild tricompartmental OA lateral narrowing [Mild patellofemoral OA] : Mild patellofemoral OA

## 2024-02-10 NOTE — ASU PATIENT PROFILE, ADULT - CENTRAL VENOUS CATHETER
Right femoral artery angiogram was performed  using a INTRODUCER SHTH 6FR 50CM 11CM GRN HUB SNPFT DIL TBG GW by hand injection.  The access site was deemed to be appropriate for closure. no

## 2024-02-12 ENCOUNTER — APPOINTMENT (OUTPATIENT)
Dept: MRI IMAGING | Facility: CLINIC | Age: 69
End: 2024-02-12
Payer: COMMERCIAL

## 2024-02-12 ENCOUNTER — APPOINTMENT (OUTPATIENT)
Dept: MRI IMAGING | Facility: CLINIC | Age: 69
End: 2024-02-12

## 2024-02-12 PROCEDURE — 73721 MRI JNT OF LWR EXTRE W/O DYE: CPT | Mod: LT

## 2024-02-16 ENCOUNTER — APPOINTMENT (OUTPATIENT)
Dept: ORTHOPEDIC SURGERY | Facility: CLINIC | Age: 69
End: 2024-02-16
Payer: COMMERCIAL

## 2024-02-16 DIAGNOSIS — S83.232D COMPLEX TEAR OF MEDIAL MENISCUS, CURRENT INJURY, LEFT KNEE, SUBSEQUENT ENCOUNTER: ICD-10-CM

## 2024-02-16 PROCEDURE — 99214 OFFICE O/P EST MOD 30 MIN: CPT | Mod: 57

## 2024-02-16 NOTE — HISTORY OF PRESENT ILLNESS
[7] : 7 [6] : 6 [Dull/Aching] : dull/aching [Ice] : ice [de-identified] : 2/7/24: Here for left knee pain x 1 month, worsening over the last week. Has tried tylenol with some relief. No prior left knee injury.   PMHX: Liver disease, HTN   2/16/24: here to review MRI results for left knee.  [] : no [FreeTextEntry1] : L KNEE [FreeTextEntry3] : 2/4/24 [FreeTextEntry5] : no injury hurts to walk hears clicking [FreeTextEntry9] : rest [de-identified] : activity

## 2024-02-16 NOTE — IMAGING
[de-identified] : Mild effusion, no warmth, no ecchymosis Medial joint line tenderness to palpation Range of motion 0-130 5/5 quadriceps and hamstring strength Positive [facility]anes[facility] No varus or valgus instability, negative lachman Motor and sensory intact distally Mildly antalgic gait

## 2024-02-16 NOTE — ASSESSMENT
[FreeTextEntry1] : Reviewed MRI in detail.  As I suspected he does have a medial meniscal tear.  Minimal arthritis.  Risk benefits and alternatives to arthroscopic partial medial meniscectomy reviewed.  He underwent successful meniscectomy for a very similar presentation on the contralateral knee and reports excellent results.  Also discussed corticosteroid injection physical therapy a conservative approach but given the sudden appearance of the symptoms including mechanical symptoms and exquisite medial joint line pain this all points to meniscal pathology.  Postoperative course outlined.  The risks and benefits of surgery have been discussed. Risks include but are not limited to bleeding, infection, reaction to anesthesia, injury to blood vessels and nerves, malunion, nonunion, DVT, PE, necessity of repeat surgery, chronic pain, loss of limb and death. The patient understands the risks and agrees with the surgical plan. All questions have been answered.

## 2024-03-12 ENCOUNTER — OFFICE (OUTPATIENT)
Dept: URBAN - METROPOLITAN AREA CLINIC 109 | Facility: CLINIC | Age: 69
Setting detail: OPHTHALMOLOGY
End: 2024-03-12
Payer: COMMERCIAL

## 2024-03-12 DIAGNOSIS — H11.002: ICD-10-CM

## 2024-03-12 DIAGNOSIS — H25.13: ICD-10-CM

## 2024-03-12 DIAGNOSIS — H35.363: ICD-10-CM

## 2024-03-12 DIAGNOSIS — H02.832: ICD-10-CM

## 2024-03-12 DIAGNOSIS — H02.831: ICD-10-CM

## 2024-03-12 DIAGNOSIS — H40.033: ICD-10-CM

## 2024-03-12 DIAGNOSIS — H02.835: ICD-10-CM

## 2024-03-12 DIAGNOSIS — H02.834: ICD-10-CM

## 2024-03-12 PROCEDURE — 92020 GONIOSCOPY: CPT | Performed by: OPHTHALMOLOGY

## 2024-03-12 PROCEDURE — 92014 COMPRE OPH EXAM EST PT 1/>: CPT | Performed by: OPHTHALMOLOGY

## 2024-03-12 ASSESSMENT — REFRACTION_MANIFEST
OD_VA1: 20/30+
OS_VA1: 20/25-
OS_CYLINDER: -1.75
OS_CYLINDER: -1.25
OS_VA1: 20/30+
OD_SPHERE: PL
OD_SPHERE: -0.50
OS_SPHERE: PL
OS_SPHERE: -0.50-
OD_ADD: +2.75
OS_AXIS: 75
OS_ADD: +2.75
OS_ADD: +2.75
OD_VA1: 20/25+
OS_AXIS: 80
OD_ADD: +2.75

## 2024-03-12 ASSESSMENT — REFRACTION_CURRENTRX
OD_OVR_VA: 20/
OS_OVR_VA: 20/
OS_SPHERE: -0.50
OD_VPRISM_DIRECTION: PROGS
OS_AXIS: 76
OD_ADD: +2.00
OD_SPHERE: -0.25
OS_CYLINDER: -1.00
OS_ADD: +2.00
OS_VPRISM_DIRECTION: PROGS

## 2024-03-26 ENCOUNTER — APPOINTMENT (OUTPATIENT)
Dept: ORTHOPEDIC SURGERY | Facility: AMBULATORY SURGERY CENTER | Age: 69
End: 2024-03-26

## 2024-03-28 ENCOUNTER — APPOINTMENT (OUTPATIENT)
Dept: ORTHOPEDIC SURGERY | Facility: CLINIC | Age: 69
End: 2024-03-28

## 2024-04-05 ENCOUNTER — APPOINTMENT (OUTPATIENT)
Dept: ORTHOPEDIC SURGERY | Facility: CLINIC | Age: 69
End: 2024-04-05

## 2024-09-10 ENCOUNTER — OFFICE (OUTPATIENT)
Dept: URBAN - METROPOLITAN AREA CLINIC 109 | Facility: CLINIC | Age: 69
Setting detail: OPHTHALMOLOGY
End: 2024-09-10
Payer: MEDICARE

## 2024-09-10 DIAGNOSIS — H02.832: ICD-10-CM

## 2024-09-10 DIAGNOSIS — H02.834: ICD-10-CM

## 2024-09-10 DIAGNOSIS — H02.835: ICD-10-CM

## 2024-09-10 DIAGNOSIS — H35.363: ICD-10-CM

## 2024-09-10 DIAGNOSIS — H11.002: ICD-10-CM

## 2024-09-10 DIAGNOSIS — H02.831: ICD-10-CM

## 2024-09-10 DIAGNOSIS — H25.13: ICD-10-CM

## 2024-09-10 DIAGNOSIS — H40.033: ICD-10-CM

## 2024-09-10 PROCEDURE — 92014 COMPRE OPH EXAM EST PT 1/>: CPT | Performed by: OPHTHALMOLOGY

## 2024-09-10 ASSESSMENT — CONFRONTATIONAL VISUAL FIELD TEST (CVF)
OD_FINDINGS: FULL
OS_FINDINGS: FULL

## 2025-01-05 PROBLEM — S52.572A OTHER CLOSED INTRA-ARTICULAR FRACTURE OF DISTAL END OF LEFT RADIUS, INITIAL ENCOUNTER: Status: ACTIVE | Noted: 2025-01-05

## 2025-01-15 ENCOUNTER — OFFICE (OUTPATIENT)
Dept: URBAN - METROPOLITAN AREA CLINIC 109 | Facility: CLINIC | Age: 70
Setting detail: OPHTHALMOLOGY
End: 2025-01-15
Payer: MEDICARE

## 2025-01-15 DIAGNOSIS — H02.831: ICD-10-CM

## 2025-01-15 DIAGNOSIS — H02.834: ICD-10-CM

## 2025-01-15 DIAGNOSIS — H02.832: ICD-10-CM

## 2025-01-15 DIAGNOSIS — H02.835: ICD-10-CM

## 2025-01-15 DIAGNOSIS — H11.002: ICD-10-CM

## 2025-01-15 DIAGNOSIS — H25.13: ICD-10-CM

## 2025-01-15 DIAGNOSIS — H40.033: ICD-10-CM

## 2025-01-15 DIAGNOSIS — H35.363: ICD-10-CM

## 2025-01-15 DIAGNOSIS — H16.223: ICD-10-CM

## 2025-01-15 PROCEDURE — 99213 OFFICE O/P EST LOW 20 MIN: CPT | Performed by: OPHTHALMOLOGY

## 2025-01-15 ASSESSMENT — REFRACTION_MANIFEST
OS_SPHERE: -0.50-
OS_CYLINDER: -1.25
OD_ADD: +2.75
OD_VA1: 20/30+
OS_ADD: +2.75
OS_SPHERE: +0.50
OD_ADD: +2.75
OS_AXIS: 85
OS_VA1: 20/25-3
OD_VA1: 20/25+
OS_VA1: 20/30+
OS_ADD: +2.75
OD_SPHERE: -0.50
OD_SPHERE: PL
OS_AXIS: 75
OS_CYLINDER: -1.75

## 2025-01-15 ASSESSMENT — REFRACTION_AUTOREFRACTION
OD_SPHERE: +0.50
OS_CYLINDER: -2.25
OS_SPHERE: +0.25
OD_CYLINDER: -1.25
OS_AXIS: 79
OD_AXIS: 119

## 2025-01-15 ASSESSMENT — REFRACTION_CURRENTRX
OD_SPHERE: -0.25
OD_OVR_VA: 20/
OS_ADD: +2.00
OD_ADD: +2.00
OD_VPRISM_DIRECTION: PROGS
OS_SPHERE: -0.50
OS_CYLINDER: -1.00
OS_VPRISM_DIRECTION: PROGS
OS_OVR_VA: 20/
OS_AXIS: 76

## 2025-01-15 ASSESSMENT — CONFRONTATIONAL VISUAL FIELD TEST (CVF)
OD_FINDINGS: FULL
OS_FINDINGS: FULL

## 2025-01-15 ASSESSMENT — VISUAL ACUITY
OS_BCVA: 20/30-2
OD_BCVA: 20/30-2

## 2025-01-15 ASSESSMENT — CORNEAL PTERYGIUM: OS_PTERYGIUM: NASAL

## 2025-01-24 ENCOUNTER — APPOINTMENT (OUTPATIENT)
Dept: ORTHOPEDIC SURGERY | Facility: CLINIC | Age: 70
End: 2025-01-24
Payer: MEDICARE

## 2025-01-24 VITALS — BODY MASS INDEX: 26.6 KG/M2 | WEIGHT: 190 LBS | HEIGHT: 71 IN

## 2025-01-24 DIAGNOSIS — S52.572A OTHER INTRAARTICULAR FRACTURE OF LOWER END OF LEFT RADIUS, INITIAL ENCOUNTER FOR CLOSED FRACTURE: ICD-10-CM

## 2025-01-24 PROCEDURE — 73110 X-RAY EXAM OF WRIST: CPT | Mod: LT

## 2025-01-24 PROCEDURE — 99202 OFFICE O/P NEW SF 15 MIN: CPT | Mod: 25

## 2025-02-07 ENCOUNTER — APPOINTMENT (OUTPATIENT)
Dept: ORTHOPEDIC SURGERY | Facility: CLINIC | Age: 70
End: 2025-02-07

## 2025-02-07 VITALS — BODY MASS INDEX: 26.6 KG/M2 | WEIGHT: 190 LBS | HEIGHT: 71 IN

## 2025-02-07 DIAGNOSIS — S52.572A OTHER INTRAARTICULAR FRACTURE OF LOWER END OF LEFT RADIUS, INITIAL ENCOUNTER FOR CLOSED FRACTURE: ICD-10-CM

## 2025-02-07 PROCEDURE — 99213 OFFICE O/P EST LOW 20 MIN: CPT

## 2025-02-07 PROCEDURE — 73110 X-RAY EXAM OF WRIST: CPT | Mod: LT

## 2025-03-07 ENCOUNTER — APPOINTMENT (OUTPATIENT)
Dept: ORTHOPEDIC SURGERY | Facility: CLINIC | Age: 70
End: 2025-03-07

## 2025-03-07 DIAGNOSIS — S52.572A OTHER INTRAARTICULAR FRACTURE OF LOWER END OF LEFT RADIUS, INITIAL ENCOUNTER FOR CLOSED FRACTURE: ICD-10-CM

## 2025-03-07 PROCEDURE — 99213 OFFICE O/P EST LOW 20 MIN: CPT

## 2025-04-18 ENCOUNTER — APPOINTMENT (OUTPATIENT)
Dept: ORTHOPEDIC SURGERY | Facility: CLINIC | Age: 70
End: 2025-04-18

## 2025-05-22 ENCOUNTER — OFFICE (OUTPATIENT)
Dept: URBAN - METROPOLITAN AREA CLINIC 109 | Facility: CLINIC | Age: 70
Setting detail: OPHTHALMOLOGY
End: 2025-05-22
Payer: MEDICARE

## 2025-05-22 DIAGNOSIS — H02.832: ICD-10-CM

## 2025-05-22 DIAGNOSIS — H02.834: ICD-10-CM

## 2025-05-22 DIAGNOSIS — H11.002: ICD-10-CM

## 2025-05-22 DIAGNOSIS — H40.033: ICD-10-CM

## 2025-05-22 DIAGNOSIS — H16.223: ICD-10-CM

## 2025-05-22 DIAGNOSIS — H35.363: ICD-10-CM

## 2025-05-22 DIAGNOSIS — H25.13: ICD-10-CM

## 2025-05-22 DIAGNOSIS — H02.835: ICD-10-CM

## 2025-05-22 DIAGNOSIS — H02.831: ICD-10-CM

## 2025-05-22 PROCEDURE — 92020 GONIOSCOPY: CPT | Performed by: OPHTHALMOLOGY

## 2025-05-22 PROCEDURE — 92014 COMPRE OPH EXAM EST PT 1/>: CPT | Performed by: OPHTHALMOLOGY

## 2025-05-22 ASSESSMENT — REFRACTION_CURRENTRX
OD_VPRISM_DIRECTION: PROGS
OD_ADD: +2.00
OD_SPHERE: -0.25
OD_OVR_VA: 20/
OS_AXIS: 76
OS_OVR_VA: 20/
OS_ADD: +2.00
OS_SPHERE: -0.50
OS_CYLINDER: -1.00
OS_VPRISM_DIRECTION: PROGS

## 2025-05-22 ASSESSMENT — REFRACTION_AUTOREFRACTION
OD_CYLINDER: -1.25
OD_SPHERE: +0.50
OS_SPHERE: +0.25
OD_AXIS: 119
OS_AXIS: 79
OS_CYLINDER: -2.25

## 2025-05-22 ASSESSMENT — REFRACTION_MANIFEST
OD_VA1: 20/30+2
OD_SPHERE: PLANO
OD_SPHERE: -0.50
OS_SPHERE: -0.50-
OS_SPHERE: +0.50
OS_ADD: +2.75
OS_VA1: 20/30+
OD_ADD: +2.75
OS_CYLINDER: -1.75
OS_AXIS: 75
OS_VA1: 20/30+
OS_ADD: +2.75
OS_AXIS: 85
OD_ADD: +2.75
OS_CYLINDER: -1.25
OD_VA1: 20/25+

## 2025-05-22 ASSESSMENT — CORNEAL PTERYGIUM: OS_PTERYGIUM: NASAL

## 2025-05-22 ASSESSMENT — TONOMETRY: OS_IOP_MMHG: 11

## 2025-05-22 ASSESSMENT — VISUAL ACUITY
OS_BCVA: 20/30-2
OD_BCVA: 20/30-2

## 2025-05-22 ASSESSMENT — CONFRONTATIONAL VISUAL FIELD TEST (CVF)
OS_FINDINGS: FULL
OD_FINDINGS: FULL